# Patient Record
Sex: MALE | Race: WHITE | NOT HISPANIC OR LATINO | Employment: FULL TIME | URBAN - METROPOLITAN AREA
[De-identification: names, ages, dates, MRNs, and addresses within clinical notes are randomized per-mention and may not be internally consistent; named-entity substitution may affect disease eponyms.]

---

## 2019-12-18 ENCOUNTER — OFFICE VISIT CONVERTED (OUTPATIENT)
Dept: PULMONOLOGY | Facility: CLINIC | Age: 54
End: 2019-12-18
Attending: INTERNAL MEDICINE

## 2020-06-05 ENCOUNTER — OFFICE VISIT CONVERTED (OUTPATIENT)
Dept: PULMONOLOGY | Facility: CLINIC | Age: 55
End: 2020-06-05
Attending: NURSE PRACTITIONER

## 2021-05-28 VITALS
TEMPERATURE: 97.5 F | HEIGHT: 71 IN | SYSTOLIC BLOOD PRESSURE: 175 MMHG | WEIGHT: 220.5 LBS | RESPIRATION RATE: 18 BRPM | OXYGEN SATURATION: 99 % | HEART RATE: 62 BPM | DIASTOLIC BLOOD PRESSURE: 102 MMHG | BODY MASS INDEX: 30.87 KG/M2

## 2021-05-28 NOTE — PROGRESS NOTES
Patient: NELLA PIEDRA     Acct: [id number]     Report: #[id number]  UNIT #: [id number]     : 1965    Encounter Date:2019  PRIMARY CARE: TAINA CANALES  ***Signed***  --------------------------------------------------------------------------------------------------------------------  Chief Complaint      Encounter Date      Dec 18, 2019            Primary Care Provider      TAINA CANALES            Referring Provider      TAINA CANALES            Patient Complaint      Patient is complaining of      New Pt, Sarcoidosis            VITALS      Height 5 ft 11 in / 180.34 cm      Weight 220 lbs 8 oz / 100.357335 kg      BSA 2.20 m2      BMI 30.8 kg/m2      Temperature 97.5 F / 36.39 C - Oral      Pulse 62      Respirations 18      Blood Pressure 175/102 Sitting, Left Arm      Pulse Oximetry 99%, room air            HPI      The patient is a 54 year old male with diagnosis of sarcoidosis in  on     mediastinoscopy who is here today to establish care. The patient does complain     of shortness of breath. His shortness of breath is intermittent, worse with     certain fragrances, worse with exertion, does have associated wheezing,     shortness of breath has been present since being diagnosed in .  The patient    has underwent bronchoscopy x2 and was found to have endobronchial involvement     with sarcoid as well.  The patient is currently taking Trelegy prescribed to him    by his PCP and he has noticed significant improvement in his symptoms while     being on this medication.  He has no chest pains, heart palpitations or lower     extremity edema.  No visual changes at this time.  He denies having any skin     changes or neurologic changes, he does complain of some diffuse arthropathies.            ROS      Constitutional:  Complains of: Fatigue; Denies: Fever, Weight gain, Weight loss,    Chills, Insomnia, Other      Respiratory/Breathing:  Denies: Shortness of air, Wheezing, Cough,  Hemoptysis,     Pleuritic pain, Other      Endocrine:  Denies: Polydipsia, Polyuria, Heat/cold intolerance, Diabetes, Other      Eyes:  Denies: Blurred vision, Vision Changes, Other      Ears, nose, mouth, throat:  Denies: Mouth lesions, Thrush, Throat pain,     Hoarseness, Allergies/Hay Fever, Post Nasal Drip, Headaches, Recent Head Injury,    Nose Bleeding, Neck Stiffness, Thyroid Mass, Hearing Loss, Ear Fullness, Dry     Mouth, Nasal or Sinus Pain, Dry Lips, Nasal discharge, Nasal congestion, Other      Cardiovascular:  Denies: Palpitations, Syncope, Claudication, Chest Pain, Wake     up Gasping for air, Leg Swelling, Irregular Heart Rate, Cyanosis, Dyspnea on     Exertion, Other      Gastrointestinal:  Denies: Nausea, Constipation, Diarrhea, Abdominal pain,     Vomiting, Difficulty Swallowing, Reflux/Heartburn, Dysphagia, Jaundice,     Bloating, Melena, Bloody stools, Other      Genitourinary:  Denies: Urinary frequency, Incontinence, Hematuria, Urgency,     Nocturia, Dysuria, Testicular problems, Other      Musculoskeletal:  Denies: Joint Pain, Joint Stiffness, Joint Swelling, Myalgias,    Other      Hematologic/lymphatic:  DENIES: Lymphadenopathy, Bruising, Bleeding tendencies,     Other      Neurological:  Denies: Headache, Numbness, Weakness, Seizures, Other      Psychiatric:  Denies: Anxiety, Appropriate Effect, Depression, Other      Sleep:  No: Excessive daytime sleep, Morning Headache?, Snoring, Insomnia?, Stop    breathing at sleep?, Other      Integumentary:  Denies: Rash, Dry skin, Skin Warm to Touch, Other      Immunologic/Allergic:  Denies: Latex allergy, Seasonal allergies, Asthma,     Urticaria, Eczema, Other      Immunization status:  No: Up to date            FAMILY/SOCIAL/MEDICAL HX      Surgical History:  Yes: Throat Surgery (bronscopy), Other Surgeries ([place]    dosis, stint placement heart, heart caths)      Stroke - Family Hx:  Father      Heart - Family Hx:  Father, Grandparent, Uncle       Is Father Still Living?:  Yes      Is Mother Still Living?:  Yes      Social History:  No Tobacco Use, No Alcohol Use, No Recreational Drug use      Smoking status:  Never smoker      Anticoagulation Therapy:  Yes      Antibiotic Prophylaxis:  No      Medical History:  Yes: Allergies, Arthritis, Heart Attack, High Blood Pressure,     High Cholesterol      Psychiatric History      None            PREVENTION      Hx Influenza Vaccination:  Yes      Date Influenza Vaccine Given:  Dec 1, 2019      Influenza Vaccine Declined:  No      2 or More Falls Past Year?:  Yes      Fall Past Year with Injury?:  Yes      Hx Pneumococcal Vaccination:  Yes      Chart initiated by      Blanka Farrar MA      [facility]            ALLERGIES/MEDICATIONS      Allergies:        Coded Allergies:             Cephalosporins (Verified  Allergy, Intermediate, 12/18/19)      Medications    Last Reconciled on 12/18/19 10:35 by NISHA JOSEPH MD      Folic Acid (Folic Acid*) 0.4 Mg Tablet      400 MCG PO QDAY, TAB         Reported         12/18/19       Cyanocobalamin (Vitamin B-12*) 1,000 Mcg Tablet      1000 MCG PO QDAY, #30 TAB 0 Refills         Reported         12/18/19       Metoprolol Succinate (Metoprolol Succinate) 25 Mg Tab.er.24h      25 MG PO QDAY, #30 TAB 0 Refills         Reported         12/18/19       Clopidogrel Bisulfate (Plavix) 75 Mg Tablet      75 MG PO QDAY, TAB         Reported         12/18/19       Fluticasone/Umeclidin/Vilanter (Trelegy Ellipta 100-62.5-25) 1 Each Blst.w.dev      1 PUFF INH QDAY, #1 INH         Reported         12/18/19      Current Medications      Current Medications Reviewed 12/18/19            EXAM      Vital Signs Reviewed.      General:  WDWN, Alert, NAD.      HEENT: PERRL, EOMI.  OP, nares clear, no sinus tenderness.      Neck: Supple, no JVD, no thyromegaly.      Lymph: No axillary, cervical, supraclavicular lymphadenopathy noted bilaterally.      Chest: Good aeration, clear to auscultation bilaterally,  tympanic to percussion     bilaterally, no work of breathing noted.      CV: RRR, no MGR, pulses 2+, equal.        Abd: Soft, NT, ND, +BS, no HSM.      EXT: No clubbing, no cyanosis, no edema, no joint tenderness.        Neuro:  A  Skin: No rashes or lesions.      Vtials      Vitals:             Height 5 ft 11 in / 180.34 cm           Weight 220 lbs 8 oz / 100.207464 kg           BSA 2.20 m2           BMI 30.8 kg/m2           Temperature 97.5 F / 36.39 C - Oral           Pulse 62           Respirations 18           Blood Pressure 175/102 Sitting, Left Arm           Pulse Oximetry 99%, room air            REVIEW      Results Reviewed      PCCS Results Reviewed?:  Yes Prev Lab Results, Yes Prev Radiology Results, Yes     Previous [facility] Records            Assessment      Sarcoidosis - D86.9            Notes      New Medications      * Fluticasone/Umeclidin/Vilanter (Trelegy Ellipta 100-62.5-25) 1 EACH       BLST.W.DEV: 1 PUFF INH QDAY #1      * Clopidogrel Bisulfate (Plavix) 75 MG TABLET: 75 MG PO QDAY      * Metoprolol Succinate 25 MG TAB.ER.24H: 25 MG PO QDAY #30      * Cyanocobalamin (Vitamin B-12*) 1,000 MCG TABLET: 1,000 MCG PO QDAY #30      * Folic Acid (Folic Acid*) 0.4 MG TABLET: 400 MCG PO QDAY      New Diagnostics      * OH 1,25 dihyroxyvitamin D3, 6 Months         Dx: Sarcoidosis - D86.9      * CBC, 6 Months         Dx: Sarcoidosis - D86.9      * CMP Comp Metabolic Panel, 6 Months         Dx: Sarcoidosis - D86.9      New Office Procedures      * Flu Vacc Fluarix Quadrivalent, As Soon As Possible         Flu Vacc [id number](6Mos Up)/Pf (Fluarix Quadrivalent [id number] Syringe) 60        MCG/0.5 ML SYRINGE: 60 MICROGRAM INTRAMUSCULARLY Qty 1 SYRINGE      ASSESSMENT:       1.  Sarcoidosis.      2.  Endobronchial sarcoid.      3. Dyspnea.      4. History of renal stones.              PLAN:      1. We will continue patient on an inhaled steroid at this time. It seems like     his sarcoid is pretty well controlled.       2. We will obtain a vitamin D 1, 25 OH level.      3. We will obtain renal panel and CBC.      4. We will get pulmonary function studies and most recent high resolution CT     scan from [facility], this was performed in October of this year.      5. No indication for systemic treatment at this time.      6. I have personally reviewed laboratory data, imaging as well as previous     medical records.            Patient Education      Education resources provided:  Yes      Patient Education Provided:  Sarcoidosis            Patient Education:        Sarcoidosis            Electronically signed by Jose Soni  01/13/2020 11:50       Disclaimer: Converted document may not contain table formatting or lab diagrams. Please see [employer] for the authenticated document.

## 2021-05-28 NOTE — PROGRESS NOTES
Patient: NELLA ALONSO     Acct: [id number]     Report: #[id number]  UNIT #: [id number]     : 1965    Encounter Date:2020  PRIMARY CARE: TAINA CANALES  ***Signed***  --------------------------------------------------------------------------------------------------------------------  TELEHEALTH NOTE      History of Present Illness      Chief Complaint: 6 month follow up            Nella Alonso is presenting for evaluation via Telehealth visit by phone. Verbal     consent obtained before beginning visit.            Provider spent 23 minutes with the patient during telehealth visit.            The following staff were present during the visit: Chinyere Gonzalez MA, Corrina CAMERON            The patient is a 54 year old male patient of Dr. Soni's with a diagnosis of     sarcoidosis in  on mediascopy  who presents for Telehealth visit today. The     patient states his shortness of air is significantly improved since starting     trelegy ellipta inhaler. The patient states he does have some body aches. 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 The patient denies    any cough, wheezing fever or chills, night sweats, hemoptysis,  purulent sputum     production, swollen glands in head and neck, unintentional weight loss, chest     pain or chest tightness, abdominal pain, nausea or vomiting or diarrhea. The     patient states he does get intermittent swelling in his ankle after he has been     on his feet for 12 hours a day. The patient denies any calf pain. The patient     states he is under the care of Dr. Oglesby cardiologist in [place] for     coronary artery disease due to having a stent placed several years ago however     he would like to see Dr. Rincon in [place] and would like a  referral to    see him. The patient states he is also under the care of Dr. Rinne     ophthalmologist and get his yearly eye exams through him. The patient states he     has not had to take any antibiotics or steroids since his last office visit and     denies any hospitalizations since his office visit. The patient states he is     able to perform all his activities of daily living without difficulty. The     patient states he had his labs done at [facility] on 06/03/2020     and was told that his creatinine was elevated and his GFR was low. The patient     would like to see a kidney doctor. The patient states in 2015 he was seen at the    [facility] for sarcoidosis and had taken steroids in the past however he     does not like to take steroids because he is not able to focus and they make him    feel shaky inside. The patient states he was also tried on Plaquenil for sarc    oidosis however he did not tolerate the medication well. The patient states he     does not want any kind of medication therapy at this time for sarcoidosis.             I reviewed the Review of Systems, medical, surgical and family history and agree    with those as entered.               Physical exam is deferred due to Telehealth visit.            I personally reviewed Dr. Soni's last office note.                          Past Med History      Hx Sarcoidois dyspnea      never smoked       vaccines current      Overview of Symptoms      admits to body aches       denies soa cough wheezing fever chills            Allergies/Medications      Allergies:        Coded Allergies:             CEPHALOSPORINS (Verified  Allergy, Intermediate, 12/18/19)      Medications    Last Reconciled on 6/5/20 14:44 by JAQUELIN CORONADO       Fluticasone/Umeclidin/Vilanter (Trelegy Ellipta 100-62.5-25) 1 Each Blst.w.dev      1 PUFF INH QDAY, #1 INH         Prov: JAQUELIN CORONADO [facility]         6/5/20       Cyanocobalamin (Vitamin B-12*)  1,000 Mcg Tablet      1000 MCG PO QDAY, #30 TAB 0 Refills         Reported         12/18/19       Metoprolol Succinate (Metoprolol Succinate) 25 Mg Tab.er.24h      25 MG PO QDAY, #30 TAB 0 Refills         Reported         12/18/19       Clopidogrel Bisulfate (Plavix) 75 Mg Tablet      75 MG PO QDAY, TAB         Reported         12/18/19            Plan/Instructions      Ambulatory Assessment/Plan:        Elevated serum creatinine - R79.89            Sarcoidosis - D86.9            CAD (coronary artery disease) - I25.10            Notes      Renewed Medications      * Fluticasone/Umeclidin/Vilanter (Trelegy Ellipta 100-62.5-25) 1 EACH       BLST.W.DEV: 1 PUFF INH QDAY #1      New Diagnostics      * PFT Comp PrePost DLCO BodBx sx, Week         Dx: Sarcoidosis - D86.9      * 6 Min Walk w O2 Titration Test, Routine         Dx: Sarcoidosis - D86.9      * [facility] Pre-Op Covid Screening, Routine         Dx: Sarcoidosis - D86.9      * Chest W/ Cont High Resolution, 3 Months         Dx: Sarcoidosis - D86.9      New Referrals      * Nephrology, Routine         Dx: Elevated serum creatinine - R79.89      * Cardiology, Routine         Dx: Sarcoidosis - D86.9      Plan/Instructions      * Plan Of Care: ()            * Chronic conditions reviewed and taken into consideration for today's treatment       plan.      * Patient instructed to seek medical attention urgently for new or worsening       symptoms.      * Patient was educated/instructed on their diagnosis, treatment and medications       prior to discharge from the clinic today.            ASSESSMENT:       1. Sarcoidosis.       2.  Endobronchial sarcoid.       3. Dyspnea improved with trelegy ellipta inhaler.       4. History of renal stones.       5. Elevated creatinine.       6. Never smoker.       7. Arthralgias.       8. Coronary artery disease with history of stent.             PLAN:      1. Continue trelegy ellipta inhaler everyday as prescribed and rinse his mouth      after each use.       2. CBC and renal panel and vitamin D1, 25 level was ordered last visit and the     patient states he just had labs done at [facility] on 06/03/2020.     I will request a copy of those labs to be faxed to our office.       3. The patient states he was told his creatinine was elevated and his GFR was     low and this had been going on for some time. Upon reviewing labs sent from [facility] in July 2019, the patient had an elevated creatinine level     of 1.34. I will refer the patient to nephrology.       4. The patient states he is under the care of Dr. Oglesby in [place] for     coronary artery disease and history of stent and would like to establish care     with Dr. Rincon in [place]. I will refer the patient to Dr. Rincon.       5. The patient is having arthralgias and recommended referral to rheumatology     however the patient refuses rheumatology referral at this time. The patient     states that if his arthralgias get worse he will notify us and see rheumatology     however he does not want to take any medications at this time for sarcoidosis.       6. The patient is due for pulmonary function test and 6 minute walk test, I will     scheduled those. The patient prefers to have those done at [facility]. I will also order Pre-COVID screening.       7. The patient will be due for repeat high resolution CT scan of the chest in     October 2020, order placed today.       8. The patient is advised to call the office, call 911 or go to the ER for any     new or worsening symptoms.       9. The patient reports he is up to date with flu and pneumonia vaccines.       10. The patient is advised to avoid NSAIDS with elevated creatinine level and to     take  Tylenol for arthritic pain.       11. The patient is advised to follow up with Dr. Rinne ophthalmologist as     scheduled.       12. Follow up with Dr. Soni in 2 months,  sooner if needed.      Codes:  Phone Eval 21-30 mi [id number]            Electronically signed by JAQUELIN CORONADO [facility]  06/18/2020 11:57       Disclaimer: Converted document may not contain table formatting or lab diagrams. Please see [employer] for the authenticated document.

## 2022-09-21 ENCOUNTER — OFFICE VISIT (OUTPATIENT)
Dept: PULMONOLOGY | Facility: CLINIC | Age: 57
End: 2022-09-21

## 2022-09-21 VITALS
DIASTOLIC BLOOD PRESSURE: 85 MMHG | WEIGHT: 213.7 LBS | HEART RATE: 59 BPM | RESPIRATION RATE: 16 BRPM | TEMPERATURE: 98.2 F | OXYGEN SATURATION: 98 % | HEIGHT: 71 IN | SYSTOLIC BLOOD PRESSURE: 131 MMHG | BODY MASS INDEX: 29.92 KG/M2

## 2022-09-21 DIAGNOSIS — D86.9 SARCOIDOSIS: Primary | ICD-10-CM

## 2022-09-21 DIAGNOSIS — R06.09 DYSPNEA ON EXERTION: ICD-10-CM

## 2022-09-21 DIAGNOSIS — R05.9 COUGH: ICD-10-CM

## 2022-09-21 DIAGNOSIS — G47.33 OSA (OBSTRUCTIVE SLEEP APNEA): ICD-10-CM

## 2022-09-21 DIAGNOSIS — I10 ESSENTIAL HYPERTENSION: ICD-10-CM

## 2022-09-21 DIAGNOSIS — R06.2 WHEEZING: ICD-10-CM

## 2022-09-21 DIAGNOSIS — R09.89 CHEST CONGESTION: ICD-10-CM

## 2022-09-21 PROCEDURE — 99214 OFFICE O/P EST MOD 30 MIN: CPT | Performed by: NURSE PRACTITIONER

## 2022-09-21 RX ORDER — AMLODIPINE BESYLATE 5 MG/1
TABLET ORAL
COMMUNITY
Start: 2021-10-02

## 2022-09-21 RX ORDER — SODIUM, POTASSIUM,MAG SULFATES 17.5-3.13G
SOLUTION, RECONSTITUTED, ORAL ORAL
COMMUNITY
Start: 2022-09-13

## 2022-09-21 RX ORDER — DOXYCYCLINE 100 MG/1
TABLET ORAL
COMMUNITY
Start: 2022-09-16

## 2022-09-21 RX ORDER — LISINOPRIL 10 MG/1
TABLET ORAL
COMMUNITY

## 2022-09-21 RX ORDER — IPRATROPIUM BROMIDE AND ALBUTEROL SULFATE 2.5; .5 MG/3ML; MG/3ML
3 SOLUTION RESPIRATORY (INHALATION) 4 TIMES DAILY PRN
Qty: 360 ML | Refills: 3 | Status: SHIPPED | OUTPATIENT
Start: 2022-09-21

## 2022-09-21 RX ORDER — LANOLIN ALCOHOL/MO/W.PET/CERES
400 CREAM (GRAM) TOPICAL
COMMUNITY

## 2022-09-21 RX ORDER — LOSARTAN POTASSIUM 50 MG/1
50 TABLET ORAL 2 TIMES DAILY
COMMUNITY
Start: 2022-08-25

## 2022-09-21 RX ORDER — MODAFINIL 200 MG/1
200 TABLET ORAL EVERY MORNING
COMMUNITY
Start: 2022-07-13

## 2022-09-21 RX ORDER — FOLIC ACID 1 MG/1
TABLET ORAL DAILY
COMMUNITY
Start: 2022-01-07

## 2022-09-21 RX ORDER — PREDNISONE 1 MG/1
TABLET ORAL
COMMUNITY
Start: 2022-02-03 | End: 2022-09-21 | Stop reason: SDUPTHER

## 2022-09-21 RX ORDER — METOPROLOL SUCCINATE 50 MG/1
25 TABLET, EXTENDED RELEASE ORAL 2 TIMES DAILY
COMMUNITY
Start: 2022-08-25

## 2022-09-21 RX ORDER — DAPAGLIFLOZIN 5 MG/1
TABLET, FILM COATED ORAL
COMMUNITY
Start: 2022-09-16

## 2022-09-21 RX ORDER — FOLIC ACID 1 MG/1
1000 TABLET ORAL DAILY
COMMUNITY
Start: 2022-08-06

## 2022-09-21 RX ORDER — CLOPIDOGREL BISULFATE 75 MG/1
75 TABLET ORAL DAILY
COMMUNITY
Start: 2022-08-06

## 2022-09-21 RX ORDER — LOSARTAN POTASSIUM 50 MG/1
TABLET ORAL
COMMUNITY
Start: 2021-10-02

## 2022-09-21 RX ORDER — ROSUVASTATIN CALCIUM 40 MG/1
TABLET, COATED ORAL
COMMUNITY
Start: 2021-10-18

## 2022-09-21 RX ORDER — TAMSULOSIN HYDROCHLORIDE 0.4 MG/1
CAPSULE ORAL
COMMUNITY
Start: 2022-08-25

## 2022-09-21 RX ORDER — CLOPIDOGREL BISULFATE 75 MG/1
TABLET ORAL DAILY
COMMUNITY
Start: 2022-01-27

## 2022-09-21 RX ORDER — ROSUVASTATIN CALCIUM 40 MG/1
TABLET, COATED ORAL
COMMUNITY
Start: 2022-08-25

## 2022-09-21 RX ORDER — AMLODIPINE BESYLATE 5 MG/1
5 TABLET ORAL 2 TIMES DAILY
COMMUNITY
Start: 2022-08-25

## 2022-09-21 RX ORDER — METOPROLOL SUCCINATE 25 MG/1
TABLET, EXTENDED RELEASE ORAL
COMMUNITY
Start: 2021-10-02

## 2022-09-21 RX ORDER — ASPIRIN 81 MG/81MG
CAPSULE ORAL
COMMUNITY

## 2022-09-21 RX ORDER — FLUTICASONE PROPIONATE 50 MCG
1 SPRAY, SUSPENSION (ML) NASAL
COMMUNITY

## 2022-09-21 RX ORDER — PREDNISONE 2.5 MG
TABLET ORAL
COMMUNITY

## 2022-09-21 RX ORDER — METOPROLOL TARTRATE AND HYDROCHLOROTHIAZIDE 50; 25 MG/1; MG/1
TABLET ORAL
COMMUNITY

## 2022-09-21 RX ORDER — PREDNISONE 1 MG/1
5 TABLET ORAL DAILY
COMMUNITY
Start: 2022-08-31

## 2022-09-21 NOTE — PROGRESS NOTES
"Primary Care Provider  Concepcion Rust MD     Referring Provider  No ref. provider found     Chief Complaint  Cough, Wheezing, and Follow-up (Left chronic pneumonia, bronch evaluation. )    Subjective          Oliver Alonso presents to [facility]ULMON[facility] CRITICAL [facility]ARE MEDICINE  History of Present Illness  Oliver Alonso is a 56 y.o. male patient of Dr. Soni with management of sarcoidosis and shortness of breath.    Patient has not been seen in office since June 2020.  He states that he has had chest congestion since February 2022.  He states that he has had a few doses of antibiotics since then.  He also states that he recently saw his primary care provider last week who suggested that he may need a possible bronchoscopy.  He did have recent blood work with his primary care and states everything looked \"normal\".  His recent chest CT from 9/15/2022, shows stable disease, mild scattered reticulonodular interstitial changes in the lung apices, right perihilar lung, peripheral left upper lobe, stable since 2020, and no thoracic adenopathy.  These findings were discussed with patient today.  Dr. Soni is also able to patient scan and does not feel that he requires a bronchoscopy at this time.  She also sent him in an antibiotic at that time, but patient was unaware of this.  He states that he did just start taking his antibiotic last night.  He is on chronic prednisone at 5 mg daily.  He states that he has not had any increased doses of prednisone, as it makes him feel \"jittery inside\" and he does have issues with his blood sugar.  He states that his shortness of breath is mild in severity, worse with exertion and improved with rest.  He does use Trelegy as prescribed.  He states that he also has an albuterol inhaler, but does not use it often.  He states that he used to have stable other treatments in the past when he was first diagnosed with sarcoidosis, but has not had any recently.  He is " open to using nebulizers to help with his chest congestion.  Patient also states that he has been using Robitussin daily.  Suggest patient use Mucinex to help with his secretions, and he states that his primary care also mentioned this recently.  He continues wear his BiPAP machine nightly and is followed by sleep specialist in [place], [place].  He states that he wears it nightly and is unable to sleep without it.  He denies any excessive daytime sleepiness or morning headaches.  He does state that the specialist is ordering an overnight oximetry to see if he may need oxygen at night.  Patient also complains that he has had 2 episodes of extremely high blood pressure with syncopal episodes.  He is seeing a cardiologist and is on multiple blood pressure medications.  He is scheduled to have an echocardiogram tomorrow.  Overall, he has no additional concerns at this time.  He is able to perform his ADLs without difficulty.  He states that he does pace himself and tries his best not to get too out of breath.  He is up-to-date with his COVID and pneumonia vaccines.  He has not yet received a flu vaccine this season.     His history of smoking is   Tobacco Use: Low Risk    • Smoking Tobacco Use: Never Smoker   • Smokeless Tobacco Use: Never Used   .    Review of Systems   Constitutional: Negative for chills, fatigue, fever, unexpected weight gain and unexpected weight loss.   HENT: Negative for congestion (Nasal), hearing loss, mouth sores, nosebleeds, postnasal drip, sore throat and trouble swallowing.    Eyes: Negative for blurred vision and visual disturbance.   Respiratory: Positive for cough and shortness of breath. Negative for apnea and wheezing.         Negative for Hemoptysis     Cardiovascular: Negative for chest pain, palpitations and leg swelling.   Gastrointestinal: Negative for abdominal pain, constipation, diarrhea, nausea, vomiting and GERD.        Negative for Jaundice  Negative for  Bloating  Negative for Melena   Musculoskeletal: Negative for joint swelling and myalgias.        Negative for Joint pain  Negative for Joint stiffness   Skin: Negative for color change.        Negative for cyanosis   Neurological: Negative for syncope, weakness, numbness and headache.      Sleep: Negative for Excessive daytime sleepiness  Negative for morning headaches  Negative for Snoring    History reviewed. No pertinent family history.     Social History     Socioeconomic History   • Marital status:    Tobacco Use   • Smoking status: Never Smoker   • Smokeless tobacco: Never Used        History reviewed. No pertinent past medical history.     Immunization History   Administered Date(s) Administered   • BCG 09/16/2015   • COVID-19 (MODERNA) 1st, 2nd, 3rd Dose Only 03/17/2021, 09/17/2021   • Flu Vaccine Split Quad 10/18/2017   • Influenza, Unspecified 10/05/2021   • flucelvax quad pfs =>4 YRS 01/14/2019, 09/30/2021         Allergies   Allergen Reactions   • Cephalexin Unknown - Low Severity   • Cephalosporins Unknown - Low Severity   • Ezetimibe Unknown - Low Severity   • Levofloxacin Unknown - Low Severity   • Morphine And Related Unknown - Low Severity   • Nifedipine Unknown - Low Severity   • Penicillins Unknown - Low Severity   • Trandolapril Unknown - Low Severity          Current Outpatient Medications:   •  amLODIPine (NORVASC) 5 MG tablet, Take  by mouth., Disp: , Rfl:   •  Aspirin ([place]) 81 MG capsule, , Disp: , Rfl:   •  clopidogrel (PLAVIX) 75 MG tablet, Take  by mouth Daily., Disp: , Rfl:   •  cyanocobalamin (VITAMIN B-12) 1000 MCG tablet, Take 1,000 mcg by mouth., Disp: , Rfl:   •  doxycycline (ADOXA) 100 MG tablet, , Disp: , Rfl:   •  Farxiga 5 MG tablet tablet, , Disp: , Rfl:   •  fluticasone (FLONASE) 50 MCG/ACT nasal spray, 1 spray into the nostril(s) as directed by provider., Disp: , Rfl:   •  Fluticasone-Umeclidin-Vilant (Trelegy Ellipta) 200-62.5-25 MCG/INH inhaler, Inhale 1 puff  Daily., Disp: , Rfl:   •  folic acid (FOLVITE) 1 MG tablet, Take  by mouth Daily., Disp: , Rfl:   •  folic acid (FOLVITE) 400 MCG tablet, Take 400 mcg by mouth., Disp: , Rfl:   •  losartan (COZAAR) 50 MG tablet, Take 50 mg by mouth 2 (Two) Times a Day., Disp: , Rfl:   •  losartan (COZAAR) 50 MG tablet, Take  by mouth., Disp: , Rfl:   •  metFORMIN (GLUCOPHAGE) 500 MG tablet, Take 500 mg by mouth., Disp: , Rfl:   •  metoprolol-hydrochlorothiazide (LOPRESSOR HCT) 50-25 MG per tablet, , Disp: , Rfl:   •  predniSONE (DELTASONE) 5 MG tablet, Take 5 mg by mouth Daily., Disp: , Rfl:   •  rosuvastatin (CRESTOR) 40 MG tablet, Take  by mouth., Disp: , Rfl:   •  tamsulosin (FLOMAX) 0.4 MG capsule 24 hr capsule, TAKE ONE CAPSULE BY MOUTH EACH NIGHT AT BEDTIME, Disp: , Rfl:   •  amLODIPine (NORVASC) 5 MG tablet, Take 5 mg by mouth 2 (Two) Times a Day., Disp: , Rfl:   •  clopidogrel (PLAVIX) 75 MG tablet, Take 75 mg by mouth Daily., Disp: , Rfl:   •  folic acid (FOLVITE) 1 MG tablet, Take 1,000 mcg by mouth Daily., Disp: , Rfl:   •  ipratropium-albuterol (DUO-NEB) 0.5-2.5 mg/3 ml nebulizer, Take 3 mL by nebulization 4 (Four) Times a Day As Needed for Wheezing., Disp: 360 mL, Rfl: 3  •  lisinopril (PRINIVIL,ZESTRIL) 10 MG tablet, Take  by mouth., Disp: , Rfl:   •  metoprolol succinate XL (TOPROL-XL) 25 MG 24 hr tablet, Take  by mouth., Disp: , Rfl:   •  metoprolol succinate XL (TOPROL-XL) 50 MG 24 hr tablet, Take 25 mg by mouth 2 (Two) Times a Day., Disp: , Rfl:   •  metoprolol tartrate (LOPRESSOR) 25 MG tablet, Take  by mouth Daily., Disp: , Rfl:   •  modafinil (PROVIGIL) 200 MG tablet, Take 200 mg by mouth Every Morning., Disp: , Rfl:   •  predniSONE (DELTASONE) 2.5 MG tablet, , Disp: , Rfl:   •  rosuvastatin (CRESTOR) 40 MG tablet, TAKE ONE TABLET BY MOUTH EACH NIGHT AT BEDTIME, Disp: , Rfl:   •  Suprep Bowel Prep Kit 17.5-3.13-1.6 GM/177ML solution oral solution, , Disp: , Rfl:      Objective   Physical Exam  Constitutional:   "     General: He is not in acute distress.     Appearance: Normal appearance. He is normal weight.   HENT:      Right Ear: Hearing normal.      Left Ear: Hearing normal.      Nose: No nasal tenderness or congestion.      Mouth/Throat:      Mouth: Mucous membranes are moist. No oral lesions.   Eyes:      Extraocular Movements: Extraocular movements intact.      Pupils: Pupils are equal, round, and reactive to light.   Neck:      Thyroid: No thyroid mass or thyromegaly.   Cardiovascular:      Rate and Rhythm: Normal rate and regular rhythm.      Pulses: Normal pulses.      Heart sounds: Normal heart sounds. No murmur heard.  Pulmonary:      Effort: Pulmonary effort is normal.      Breath sounds: Examination of the left-upper field reveals rhonchi. Rhonchi present. No wheezing or rales.   Chest:   Breasts:      Right: No axillary adenopathy.       Abdominal:      General: Bowel sounds are normal. There is no distension.      Palpations: Abdomen is soft.      Tenderness: There is no abdominal tenderness.   Musculoskeletal:      Cervical back: Neck supple.      Right lower leg: No edema.      Left lower leg: No edema.   Lymphadenopathy:      Cervical: No cervical adenopathy.      Upper Body:      Right upper body: No axillary adenopathy.   Skin:     General: Skin is warm and dry.      Findings: No lesion or rash.   Neurological:      General: No focal deficit present.      Mental Status: He is alert and oriented to person, place, and time.      Cranial Nerves: Cranial nerves are intact.   Psychiatric:         Mood and Affect: Affect normal. Mood is not anxious or depressed.         Vital Signs:   /85 (BP Location: Left arm, Patient Position: Sitting, Cuff Size: Adult)   Pulse 59   Temp 98.2 °F (36.8 °C) (Temporal)   Resp 16   Ht 180.3 cm (71\")   Wt 96.9 kg (213 lb 11.2 oz)   SpO2 98% Comment: room air  BMI 29.81 kg/m²        Result Review :       Data reviewed: Radiologic studies Scanned chest CT 9/15/2022 " and Corrina CAMERON and Dr. Soni's last office notes   Procedures        Assessment and Plan    Diagnoses and all orders for this visit:    1. Sarcoidosis (Primary)  -     ipratropium-albuterol (DUO-NEB) 0.5-2.5 mg/3 ml nebulizer; Take 3 mL by nebulization 4 (Four) Times a Day As Needed for Wheezing.  Dispense: 360 mL; Refill: 3  -     Home Nebulizer    2. Cough  -     ipratropium-albuterol (DUO-NEB) 0.5-2.5 mg/3 ml nebulizer; Take 3 mL by nebulization 4 (Four) Times a Day As Needed for Wheezing.  Dispense: 360 mL; Refill: 3  -     Home Nebulizer    3. Wheezing  -     ipratropium-albuterol (DUO-NEB) 0.5-2.5 mg/3 ml nebulizer; Take 3 mL by nebulization 4 (Four) Times a Day As Needed for Wheezing.  Dispense: 360 mL; Refill: 3  -     Home Nebulizer    4. MARTIN (obstructive sleep apnea)    5. Dyspnea on exertion    6. Chest congestion    7. Essential hypertension    8.  Continue Trelegy as prescribed.  Rinse mouth out after each use.  9.  Continue albuterol as needed.  10.  Start DuoNebs as needed.  Home nebulizer given in office today.  11.  Follow-up with sleep medicine as scheduled.  12.  Follow-up with cardiology as scheduled.  13.  Follow-up with primary care as scheduled.  14.  Follow-up with Dr. Redd in 1 to 2 months, sooner if needed        Follow Up {Instructions Charge Capture  Follow-up Communications :23}  Return in about 1 month (around 10/21/2022) for Recheck 1-2 months with Ivania.  Patient was given instructions and counseling regarding his condition or for health maintenance advice. Please see specific information pulled into the AVS if appropriate.

## 2022-09-21 NOTE — PATIENT INSTRUCTIONS
Sleep Apnea  Sleep apnea is a condition in which breathing pauses or becomes shallow during sleep. People with sleep apnea usually snore loudly. They may have times when they gasp and stop breathing for 10 seconds or more during sleep. This may happen many times during the night.  Sleep apnea disrupts your sleep and keeps your body from getting the rest that it needs. This condition can increase your risk of certain health problems, including:  Heart attack.  Stroke.  Obesity.  Type 2 diabetes.  Heart failure.  Irregular heartbeat.  High blood pressure.  The goal of treatment is to help you breathe normally again.  What are the causes?  The most common cause of sleep apnea is a collapsed or blocked airway.  There are three kinds of sleep apnea:  Obstructive sleep apnea. This kind is caused by a blocked or collapsed airway.  Central sleep apnea. This kind happens when the part of the brain that controls breathing does not send the correct signals to the muscles that control breathing.  Mixed sleep apnea. This is a combination of obstructive and central sleep apnea.  What increases the risk?  You are more likely to develop this condition if you:  Are overweight.  Smoke.  Have a smaller than normal airway.  Are older.  Are male.  Drink alcohol.  Take sedatives or tranquilizers.  Have a family history of sleep apnea.  Have a tongue or tonsils that are larger than normal.  What are the signs or symptoms?  Symptoms of this condition include:  Trouble staying asleep.  Loud snoring.  Morning headaches.  Waking up gasping.  Dry mouth or sore throat in the morning.  Daytime sleepiness and tiredness.  If you have daytime fatigue because of sleep apnea, you may be more likely to have:  Trouble concentrating.  Forgetfulness.  Irritability or mood swings.  Personality changes.  Feelings of depression.  Sexual dysfunction. This may include loss of interest if you are female, or erectile dysfunction if you are male.  How is this  diagnosed?  This condition may be diagnosed with:  A medical history.  A physical exam.  A series of tests that are done while you are sleeping (sleep study). These tests are usually done in a sleep lab, but they may also be done at home.  How is this treated?  Treatment for this condition aims to restore normal breathing and to ease symptoms during sleep. It may involve managing health issues that can affect breathing, such as high blood pressure or obesity. Treatment may include:  Sleeping on your side.  Using a decongestant if you have nasal congestion.  Avoiding the use of depressants, including alcohol, sedatives, and narcotics.  Losing weight if you are overweight.  Making changes to your diet.  Quitting smoking.  Using a device to open your airway while you sleep, such as:  An oral appliance. This is a custom-made mouthpiece that shifts your lower jaw forward.  A continuous positive airway pressure (CPAP) device. This device blows air through a mask when you breathe out (exhale).  A nasal expiratory positive airway pressure (EPAP) device. This device has valves that you put into each nostril.  A bi-level positive airway pressure (BPAP) device. This device blows air through a mask when you breathe in (inhale) and breathe out (exhale).  Having surgery if other treatments do not work. During surgery, excess tissue is removed to create a wider airway.  Follow these instructions at home:  Lifestyle  Make any lifestyle changes that your health care provider recommends.  Eat a healthy, well-balanced diet.  Take steps to lose weight if you are overweight.  Avoid using depressants, including alcohol, sedatives, and narcotics.  Do not use any products that contain nicotine or tobacco. These products include cigarettes, chewing tobacco, and vaping devices, such as e-cigarettes. If you need help quitting, ask your health care provider.  General instructions  Take over-the-counter and prescription medicines only as told  by your health care provider.  If you were given a device to open your airway while you sleep, use it only as told by your health care provider.  If you are having surgery, make sure to tell your health care provider you have sleep apnea. You may need to bring your device with you.  Keep all follow-up visits. This is important.  Contact a health care provider if:  The device that you received to open your airway during sleep is uncomfortable or does not seem to be working.  Your symptoms do not improve.  Your symptoms get worse.  Get help right away if:  You develop:  Chest pain.  Shortness of breath.  Discomfort in your back, arms, or stomach.  You have:  Trouble speaking.  Weakness on one side of your body.  Drooping in your face.  These symptoms may represent a serious problem that is an emergency. Do not wait to see if the symptoms will go away. Get medical help right away. Call your local emergency services (911 in the [place] Do not drive yourself to the hospital.  Summary  Sleep apnea is a condition in which breathing pauses or becomes shallow during sleep.  The most common cause is a collapsed or blocked airway.  The goal of treatment is to restore normal breathing and to ease symptoms during sleep.  This information is not intended to replace advice given to you by your health care provider. Make sure you discuss any questions you have with your health care provider.  Document Revised: 11/26/2021 Document Reviewed: 11/26/2021  [employer] Patient Education © 2022 [employer]

## 2022-11-08 ENCOUNTER — OFFICE VISIT (OUTPATIENT)
Dept: PULMONOLOGY | Facility: CLINIC | Age: 57
End: 2022-11-08

## 2022-11-08 VITALS
WEIGHT: 213 LBS | BODY MASS INDEX: 29.82 KG/M2 | TEMPERATURE: 98.2 F | RESPIRATION RATE: 18 BRPM | SYSTOLIC BLOOD PRESSURE: 129 MMHG | HEIGHT: 71 IN | HEART RATE: 61 BPM | DIASTOLIC BLOOD PRESSURE: 83 MMHG | OXYGEN SATURATION: 98 %

## 2022-11-08 DIAGNOSIS — D86.0 PULMONARY SARCOIDOSIS: Primary | ICD-10-CM

## 2022-11-08 PROCEDURE — 99214 OFFICE O/P EST MOD 30 MIN: CPT | Performed by: STUDENT IN AN ORGANIZED HEALTH CARE EDUCATION/TRAINING PROGRAM

## 2022-11-08 NOTE — PROGRESS NOTES
Primary Care Provider  Concepcion Rust MD   Referring Provider  No ref. provider found      Patient Complaint  No chief complaint on file.      Subjective          Oliver Alonso presents to [facility] PULMONARY & CRITICAL CARE MEDICINE      History of Presenting Illness  Oliver Alonso is a 57 y.o. male with history of sarcoidosis diagnosed 2009 on mediastinoscopy, MARTIN, hypertension here for follow-up.    Patient reports feeling pretty good today.  His symptoms have been controlled on maintenance dose of prednisone 5 mg daily.  He also uses his nebulizers twice a day every day as well his Trelegy inhaler daily.  With this regimen he feels that he is able to do his day-to-day activities.  He still verbalizes a chronic dull pain in his chest, which has been there for many years since his diagnosis of sarcoidosis.  Recent CT done did not show acute abnormalities.  Patient verbalized that he guesses labs done in [place].  He has been a never smoker. I have personally reviewed the review of systems, past family, social, medical and surgical histories; and agree with their findings.      Review of Systems  Constitutional:  No fever. No chills. No weakness.  ENT:  No sore throat, URI symptoms.   Cardiovascular:  +chronic chest pain, dull; No palpitations. No lower extremity edema.  Respiratory:  No shortness of breath, cough, pleuritic chest pain. No hemoptysis. No dyspnea.   GI:  Normal appetite. No nausea, vomiting, diarrhea. No melena.  Musculoskeletal:  No arthralgias or myalgias.  Neurologic:  No weakness    No family history on file.     Social History     Socioeconomic History   • Marital status:    Tobacco Use   • Smoking status: Never   • Smokeless tobacco: Never        No past medical history on file.     Immunization History   Administered Date(s) Administered   • BCG 09/16/2015   • COVID-19 (MODERNA) 1st, 2nd, 3rd Dose Only 03/17/2021, 09/17/2021   • Flu Vaccine Split Quad  10/18/2017   • Influenza, Unspecified 10/05/2021   • flucelvax quad pfs =>4 YRS 01/14/2019, 09/30/2021       Allergies   Allergen Reactions   • Cephalexin Unknown - Low Severity   • Cephalosporins Unknown - Low Severity   • Ezetimibe Unknown - Low Severity   • Levofloxacin Unknown - Low Severity   • Morphine And Related Unknown - Low Severity   • Nifedipine Unknown - Low Severity   • Penicillins Unknown - Low Severity   • Trandolapril Unknown - Low Severity          Current Outpatient Medications:   •  amLODIPine (NORVASC) 5 MG tablet, Take  by mouth., Disp: , Rfl:   •  amLODIPine (NORVASC) 5 MG tablet, Take 5 mg by mouth 2 (Two) Times a Day., Disp: , Rfl:   •  Aspirin (Vazalore) 81 MG capsule, , Disp: , Rfl:   •  clopidogrel (PLAVIX) 75 MG tablet, Take  by mouth Daily., Disp: , Rfl:   •  clopidogrel (PLAVIX) 75 MG tablet, Take 75 mg by mouth Daily., Disp: , Rfl:   •  cyanocobalamin (VITAMIN B-12) 1000 MCG tablet, Take 1,000 mcg by mouth., Disp: , Rfl:   •  doxycycline (ADOXA) 100 MG tablet, , Disp: , Rfl:   •  Farxiga 5 MG tablet tablet, , Disp: , Rfl:   •  fluticasone (FLONASE) 50 MCG/ACT nasal spray, 1 spray into the nostril(s) as directed by provider., Disp: , Rfl:   •  Fluticasone-Umeclidin-Vilant (Trelegy Ellipta) 200-62.5-25 MCG/INH inhaler, Inhale 1 puff Daily., Disp: , Rfl:   •  folic acid (FOLVITE) 1 MG tablet, Take 1,000 mcg by mouth Daily., Disp: , Rfl:   •  folic acid (FOLVITE) 1 MG tablet, Take  by mouth Daily., Disp: , Rfl:   •  folic acid (FOLVITE) 400 MCG tablet, Take 400 mcg by mouth., Disp: , Rfl:   •  ipratropium-albuterol (DUO-NEB) 0.5-2.5 mg/3 ml nebulizer, Take 3 mL by nebulization 4 (Four) Times a Day As Needed for Wheezing., Disp: 360 mL, Rfl: 3  •  lisinopril (PRINIVIL,ZESTRIL) 10 MG tablet, Take  by mouth., Disp: , Rfl:   •  losartan (COZAAR) 50 MG tablet, Take 50 mg by mouth 2 (Two) Times a Day., Disp: , Rfl:   •  losartan (COZAAR) 50 MG tablet, Take  by mouth., Disp: , Rfl:   •   metFORMIN (GLUCOPHAGE) 500 MG tablet, Take 500 mg by mouth., Disp: , Rfl:   •  metoprolol succinate XL (TOPROL-XL) 25 MG 24 hr tablet, Take  by mouth., Disp: , Rfl:   •  metoprolol succinate XL (TOPROL-XL) 50 MG 24 hr tablet, Take 25 mg by mouth 2 (Two) Times a Day., Disp: , Rfl:   •  metoprolol tartrate (LOPRESSOR) 25 MG tablet, Take  by mouth Daily., Disp: , Rfl:   •  metoprolol-hydrochlorothiazide (LOPRESSOR HCT) 50-25 MG per tablet, , Disp: , Rfl:   •  modafinil (PROVIGIL) 200 MG tablet, Take 200 mg by mouth Every Morning., Disp: , Rfl:   •  predniSONE (DELTASONE) 2.5 MG tablet, , Disp: , Rfl:   •  predniSONE (DELTASONE) 5 MG tablet, Take 5 mg by mouth Daily., Disp: , Rfl:   •  rosuvastatin (CRESTOR) 40 MG tablet, Take  by mouth., Disp: , Rfl:   •  rosuvastatin (CRESTOR) 40 MG tablet, TAKE ONE TABLET BY MOUTH EACH NIGHT AT BEDTIME, Disp: , Rfl:   •  Suprep Bowel Prep Kit 17.5-3.13-1.6 GM/177ML solution oral solution, , Disp: , Rfl:   •  tamsulosin (FLOMAX) 0.4 MG capsule 24 hr capsule, TAKE ONE CAPSULE BY MOUTH EACH NIGHT AT BEDTIME, Disp: , Rfl:      Objective     Vital Signs:   There were no vitals taken for this visit.    Physical Exam  Vital Signs Reviewed   WDWN, Alert, NAD.    HEENT:  EOMI.  nares clear  Neck:  Supple, no JVD, no thyromegaly  Chest:  clear to auscultation bilaterally, no wheezes, crackles; no work of breathing noted  CV: RRR, no M/G/R, pulses 2+, equal.  Abd:  Soft, NT, ND, +BS  EXT:  no clubbing, no cyanosis, no edema  Neuro:  A&Ox3, CN grossly intact, no focal deficits.  Skin: No rashes or lesions noted       Result Review :   I have personally reviewed patient's labs and images.              There is no problem list on file for this patient.      Impression and Plan    1. Sarcoidosis, biopsy-proven in 2009 from mediastinoscopy    -Continue prednisone 5 mg daily  -Continue nebulizers twice a day as needed  -Continue Trelegy inhaler daily  -Since patient has his blood work are done in  [place] with his PCP, informed him to have his vitamin D levels checked, renal function, as well as his CBC checked.  He should also have a baseline EKG.  Patient reports that he follows his cardiologist for his stent.   -will order PFTs to assess lung function  -Follow-up in 6 months    Smoking status: Reviewed  Vaccination status: Patient is up-to-date with his COVID and influenza vaccinations  Medications personally reviewed    Follow Up   No follow-ups on file.  Patient was given instructions and counseling regarding his condition or for health maintenance advice. Please see specific information pulled into the AVS if appropriate.

## 2023-02-08 ENCOUNTER — HOSPITAL ENCOUNTER (OUTPATIENT)
Dept: RESPIRATORY THERAPY | Facility: HOSPITAL | Age: 58
Discharge: HOME | End: 2023-02-08
Admitting: STUDENT IN AN ORGANIZED HEALTH CARE EDUCATION/TRAINING PROGRAM
Payer: COMMERCIAL

## 2023-02-08 DIAGNOSIS — D86.0 PULMONARY SARCOIDOSIS: ICD-10-CM

## 2023-02-08 PROCEDURE — 94729 DIFFUSING CAPACITY: CPT

## 2023-02-08 PROCEDURE — 94640 AIRWAY INHALATION TREATMENT: CPT

## 2023-02-08 PROCEDURE — 94726 PLETHYSMOGRAPHY LUNG VOLUMES: CPT

## 2023-02-08 PROCEDURE — 94729 DIFFUSING CAPACITY: CPT | Performed by: STUDENT IN AN ORGANIZED HEALTH CARE EDUCATION/TRAINING PROGRAM

## 2023-02-08 PROCEDURE — 94726 PLETHYSMOGRAPHY LUNG VOLUMES: CPT | Performed by: STUDENT IN AN ORGANIZED HEALTH CARE EDUCATION/TRAINING PROGRAM

## 2023-02-08 PROCEDURE — 94060 EVALUATION OF WHEEZING: CPT

## 2023-02-08 PROCEDURE — 94060 EVALUATION OF WHEEZING: CPT | Performed by: STUDENT IN AN ORGANIZED HEALTH CARE EDUCATION/TRAINING PROGRAM

## 2023-02-08 RX ORDER — ALBUTEROL SULFATE 2.5 MG/3ML
2.5 SOLUTION RESPIRATORY (INHALATION) ONCE
Status: COMPLETED | OUTPATIENT
Start: 2023-02-08 | End: 2023-02-08

## 2023-02-08 RX ADMIN — ALBUTEROL SULFATE 2.5 MG: 2.5 SOLUTION RESPIRATORY (INHALATION) at 09:19

## 2023-10-23 ENCOUNTER — OFFICE VISIT (OUTPATIENT)
Dept: NEUROLOGY | Facility: CLINIC | Age: 58
End: 2023-10-23
Payer: COMMERCIAL

## 2023-10-23 VITALS
HEIGHT: 71 IN | BODY MASS INDEX: 29.68 KG/M2 | WEIGHT: 212 LBS | SYSTOLIC BLOOD PRESSURE: 127 MMHG | HEART RATE: 71 BPM | DIASTOLIC BLOOD PRESSURE: 83 MMHG

## 2023-10-23 DIAGNOSIS — G40.109 LOCALIZATION-RELATED SYMPTOMATIC EPILEPSY AND EPILEPTIC SYNDROMES WITH SIMPLE PARTIAL SEIZURES, NOT INTRACTABLE, WITHOUT STATUS EPILEPTICUS: ICD-10-CM

## 2023-10-23 DIAGNOSIS — I69.30 SEQUELAE, POST-STROKE: Primary | ICD-10-CM

## 2023-10-23 PROCEDURE — 99204 OFFICE O/P NEW MOD 45 MIN: CPT | Performed by: PSYCHIATRY & NEUROLOGY

## 2023-10-23 RX ORDER — LEVETIRACETAM 500 MG/1
500 TABLET ORAL 2 TIMES DAILY
Qty: 180 TABLET | Refills: 3 | Status: SHIPPED | OUTPATIENT
Start: 2023-10-23 | End: 2023-10-23

## 2023-10-23 RX ORDER — MEMANTINE HYDROCHLORIDE 5 MG/1
10 TABLET ORAL 2 TIMES DAILY
COMMUNITY

## 2023-10-23 RX ORDER — METHYLPHENIDATE HYDROCHLORIDE 10 MG/1
10 TABLET ORAL 2 TIMES DAILY
COMMUNITY

## 2023-10-23 RX ORDER — ASPIRIN 81 MG/1
81 TABLET ORAL DAILY
COMMUNITY

## 2023-10-23 RX ORDER — APIXABAN 5 MG/1
1 TABLET, FILM COATED ORAL EVERY 12 HOURS SCHEDULED
COMMUNITY
Start: 2023-10-19

## 2023-10-23 RX ORDER — LEVETIRACETAM 500 MG/1
TABLET ORAL
Qty: 120 TABLET | Refills: 3 | Status: SHIPPED | OUTPATIENT
Start: 2023-10-23

## 2023-10-23 RX ORDER — LEVETIRACETAM 500 MG/1
500 TABLET ORAL 2 TIMES DAILY
Qty: 180 TABLET | Refills: 3 | Status: CANCELLED | OUTPATIENT
Start: 2023-10-23

## 2023-10-23 NOTE — PROGRESS NOTES
Chief Complaint  Neurologic Problem (CVA VS SEIZURE)    Subjective          Oliver Alonso is a 58 y.o. male who presents to [facility] NEUROLOGY & NEUROSURGERY  History of Present Illness  58-year-old man evaluated for memory problems as well as visual hallucinations.  He states that he has had a handful of seeing things next to him and he does not recognize what they are but he thinks that something is  .  He is seen what looks like animals a tree on the road lasting for seconds at a time.  He has had episodes in which there is a loss of recollection for time and events in which she is doing stuff in his computer and he does not remember what he is doing and makes mistakes.  It only last for minutes at a time.  There is times in which his wife talks to him and he loses his train of thought for 15 seconds.  He remembers what he is doing but he does not remember what the conversation is and he has to think about it.  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  He states that about a year ago he drove to another [facility] from where he is and got lost and does not know how he got there.  He did not know where he was.    He had an MRI of the brain that was done recently showing a stroke in the right parieto-occipital region above the tentorium.  He states that 2 years ago he got up and about few steps later his left leg and arm went weak and him that lasted for several minutes and then by the time he got home and drove he was back to normal.  He is blood pressure is elevated and he went to see his doctor and was seen at [facility] in which they did a CT scan on him and then he was transferred to [place].  He states that he was worked up at that time by cardiology but did not have an MRI of the brain.    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   "He is able to do his activities of daily living and his wife states that there is nothing wrong with his memory.    He has a history of narcolepsy and is being followed.  He has a history of pulmonary sarcoidosis.    Objective   Vital Signs:   /83   Pulse 71   Ht 180.3 cm (71\")   Wt 96.2 kg (212 lb)   BMI 29.57 kg/m²     Physical Exam   He is alert, fluent, follows commands well and he is able to tell me the history as noted above.  He is fully oriented.  Optic disc are normal bilaterally fields are full, EOMs full directions gaze, facial strength is full, soft palate elevation and tongue are normal.  There is no weakness of the upper or lower extremities and vision muscle testing.  Fine finger movements are intact.  Reflexes are symmetrical and decreased in biceps, triceps, patellar's and ankles with cerebellar testing is intact.  Station gait is able to tiptoe, heel walk, eagle without difficulty.  Armswing is normal and turning is intact.  Heart is regular rhythm normal in rate.  Carotids are clear bilaterally.        Assessment and Plan  Diagnoses and all orders for this visit:    1. Sequelae, post-stroke (Primary)  Assessment & Plan:  Showed him and his wife the abnormality that is thought to be a stroke.  His cardiologist started him on Eliquis and took him off Plavix and aspirin since he had his event on aspirin and Plavix.  I discussed with him that I will do a CTA of the head and neck.  He is going to have a follow-up visit with his cardiologist in the next 2 weeks and I would recommend for him to have a loop monitor as well as schedule him to have a transesophageal echo even though the event was most likely 2 years ago.  I discussed with him and his wife that I do not know the etiology of the stroke and it would be a good idea to have him have a loop monitor as well as a ALFONSO as part of the work-up.  We will see him again in 6 to 8 weeks time for follow-up.  Thank you for letting me participate in " his care.    Orders:  -     CT Angiogram Neck With & Without Contrast; Future  -     CT Angiogram Head; Future  -     Ambulatory Referral to Neurology    2. Localization-related symptomatic epilepsy and epileptic syndromes with simple partial seizures, not intractable, without status epilepticus  Assessment & Plan:  I discussed with him and his wife that his spells are most likely secondary to focal seizures and I will start him on Keppra 500 mg twice a day for 2 weeks and then to 1000 mg twice a day thereafter.  I discussed with him the adverse effects of the medication including psychiatric adverse effects and they are to call me for any problems.  I will also refer him to [facility] epilepsy clinic just in case that the medications are not effective and then I have to start him on another antiepileptic medication.  He is aware of [place] driving laws.  He is not to drive for 3 months.  He is to take precautions to avoid heights, power equipment, moving machinery, water.  He is to keep an event calendar.    Orders:  -     Ambulatory Referral to Neurology    Other orders  -     Discontinue: levETIRAcetam (KEPPRA) 500 MG tablet; Take 1 tablet by mouth 2 (Two) Times a Day.  Dispense: 180 tablet; Refill: 3  -     levETIRAcetam (KEPPRA) 500 MG tablet; 1 twice a day for 2 weeks then 2 twice a day thereafter.  Dispense: 120 tablet; Refill: 3         Total time spent with the patient and coordinating patient care was 46 minutes.    Follow Up  No follow-ups on file.  Patient was given instructions and counseling regarding his condition or for health maintenance advice. Please see specific information pulled into the AVS if appropriate.

## 2023-10-23 NOTE — ASSESSMENT & PLAN NOTE
Showed him and his wife the abnormality that is thought to be a stroke.  His cardiologist started him on Eliquis and took him off Plavix and aspirin since he had his event on aspirin and Plavix.  I discussed with him that I will do a CTA of the head and neck.  He is going to have a follow-up visit with his cardiologist in the next 2 weeks and I would recommend for him to have a loop monitor as well as schedule him to have a transesophageal echo even though the event was most likely 2 years ago.  I discussed with him and his wife that I do not know the etiology of the stroke and it would be a good idea to have him have a loop monitor as well as a ALFONSO as part of the work-up.  We will see him again in 6 to 8 weeks time for follow-up.  Thank you for letting me participate in his care.

## 2023-10-23 NOTE — ASSESSMENT & PLAN NOTE
I discussed with him and his wife that his spells are most likely secondary to focal seizures and I will start him on Keppra 500 mg twice a day for 2 weeks and then to 1000 mg twice a day thereafter.  I discussed with him the adverse effects of the medication including psychiatric adverse effects and they are to call me for any problems.  I will also refer him to [facility] epilepsy clinic just in case that the medications are not effective and then I have to start him on another antiepileptic medication.  He is aware of [place] driving laws.  He is not to drive for 3 months.  He is to take precautions to avoid heights, power equipment, moving machinery, water.  He is to keep an event calendar.

## 2023-10-25 ENCOUNTER — TELEPHONE (OUTPATIENT)
Dept: NEUROLOGY | Facility: CLINIC | Age: 58
End: 2023-10-25
Payer: COMMERCIAL

## 2023-10-25 DIAGNOSIS — G40.109 LOCALIZATION-RELATED SYMPTOMATIC EPILEPSY AND EPILEPTIC SYNDROMES WITH SIMPLE PARTIAL SEIZURES, NOT INTRACTABLE, WITHOUT STATUS EPILEPTICUS: Primary | ICD-10-CM

## 2023-10-25 RX ORDER — LACOSAMIDE 100 MG/1
TABLET ORAL
Qty: 60 TABLET | Refills: 5 | Status: SHIPPED | OUTPATIENT
Start: 2023-10-25

## 2023-10-25 RX ORDER — LACOSAMIDE 50 MG/1
TABLET ORAL
Qty: 14 TABLET | Refills: 0 | Status: SHIPPED | OUTPATIENT
Start: 2023-10-25

## 2023-10-25 NOTE — TELEPHONE ENCOUNTER
Called pt wife and relayed Dr. Cleary's message: We will try Vimpat 50 mg twice a day x1 week and then 100 mg twice a day thereafter.  Stop taking Keppra but do not throw away the Keppra.   Instructed to call if pt has questions or concerns.   Pt wife verbalized understanding.

## 2023-10-25 NOTE — TELEPHONE ENCOUNTER
Caller: [facility]     Relationship: WIFE     Best call back number: [phone] YOU CAN LEAVE A DETAILED MESS ON V.M IF YOU GET     What was the call regarding: PT TOOK THE RX KEPPRA YOU PRESCRIBED. / ONLY TOOK ONE TAB. THE DOSE PRETTY MUCH KNOCKED HIM OUT THE WHOLE DAY INTO LAST NIGHT. HE HAS NOT TAKEN ANY MORE SINCE MONDAY 8:30PM WAITING TO SEE WHAT TO DO.         Is it okay if the provider responds through MyChart: CALL    PLEASE  CALL ASAP TO INSTRUCT THEM ON WHAT TO DO.     PLEASE ADVISE

## 2023-11-07 ENCOUNTER — TELEPHONE (OUTPATIENT)
Dept: NEUROLOGY | Facility: CLINIC | Age: 58
End: 2023-11-07

## 2023-11-07 NOTE — TELEPHONE ENCOUNTER
[facility] received a fax that requires your attention. The document has been indexed to the patient’s chart for your review.    Reason for sending: MEDICAL RECORDS NOTIFICATION    Documents Description: ECG_[id number]_11/01/23    Name of Sender: [facility]    Date Indexed: 11/07/23    Notes (if needed): MEDICAL RECORDS NOTIFICATION

## 2023-11-14 ENCOUNTER — TELEPHONE (OUTPATIENT)
Dept: NEUROLOGY | Facility: CLINIC | Age: 58
End: 2023-11-14
Payer: COMMERCIAL

## 2023-11-14 NOTE — TELEPHONE ENCOUNTER
Caller: Oliver Alonso    Relationship: Self    Best call back number: [phone]    Which medication are you concerned about: VIMPAT    Who prescribed you this medication: DR. CORRAL    When did you start taking this medication: 3 WEEKS AGO    What are your concerns: PATIENT IS STARTING TO HAVE HALLUCINATIONS AT NIGHT SINCE BEING ON THIS MEDICINE. HE IS FINE DURING THE DAY BUT NOT AT NIGHT.    How long have you had these concerns: SINCE SUNDAY

## 2023-11-16 NOTE — TELEPHONE ENCOUNTER
Pt wife stated he did not have any issues last night. Do you want him to continue on 100mg Vimpat BID and see if those side effects have subsided?     Pt wife asked if cutting night dose in half might help?  Stated he has not had any seizure like activity. Please advise and I can call them back.

## 2023-11-20 NOTE — TELEPHONE ENCOUNTER
Carlo, would you check on this referral. It was ordered 10/23 and they have not heard anything and are having medication issues and need to be seen. Thank you.

## 2023-11-20 NOTE — TELEPHONE ENCOUNTER
Caller: Oliver Alonso    Relationship: Self    Best call back number: [phone]    What was the call regarding: PATIENT'S WIFE CALLED WITH AN UPDATE FROM THE WEEKEND. SHE STATED THAT THE HALLUCINATIONS CONTINUED THROUGH THE WEEKEND. IT ONLY HAPPENS AT NIGHT AND THEY WANT TO KNOW WHAT THE NEXT STEPS TO HELP THE PATIENT WILL BE.     PLEASE REVIEW  THANK YOU

## 2023-11-20 NOTE — TELEPHONE ENCOUNTER
Spoke with pt wife on the phone. Let her know Dr. Cleary wants him to take 1 Vimpat per day for 1 week and then stop medication. Pt was not able to tolerate Keppra either.     Has not heard from  yet.

## 2023-11-22 ENCOUNTER — TELEPHONE (OUTPATIENT)
Dept: NEUROLOGY | Facility: CLINIC | Age: 58
End: 2023-11-22

## 2023-11-22 NOTE — TELEPHONE ENCOUNTER
Caller: [facility]    Relationship: Emergency Contact    Best call back number: [phone]    Which medication are you concerned about:   lacosamide (VIMPAT) 100 MG tablet tablet   PT IS BEING WEANED OFF THIS RX.    Who prescribed you this medication:   DR CORRAL    When did you start taking this medication:   3 OR 4 WEEKS    What are your concerns: PT STARTED HAVING HALLUCINATIONS DURING THE NIGHT AND PT HAS BEEN FOLLOWING THE WEANING INSTRUCTIONS TO COME OFF THE VIMPAT.    PT WILL BE OFF THE RX BY 11-27-23.    [facility] IS ASKING WHAT RX WILL THE PT BE TAKING TO REPLACE THE VIMPAT TO PREVENT SEIZURES..    PLEASE CALL [facility] BACK.

## 2023-11-22 NOTE — TELEPHONE ENCOUNTER
Called [facility], they state that they still haven't received the referral from us but they did have a referral for the same dx and that it's still being triaged by the office.

## 2023-11-27 ENCOUNTER — TELEPHONE (OUTPATIENT)
Dept: NEUROSURGERY | Facility: CLINIC | Age: 58
End: 2023-11-27
Payer: COMMERCIAL

## 2023-11-28 ENCOUNTER — OFFICE VISIT (OUTPATIENT)
Dept: NEUROLOGY | Facility: CLINIC | Age: 58
End: 2023-11-28
Payer: COMMERCIAL

## 2023-11-28 VITALS
DIASTOLIC BLOOD PRESSURE: 80 MMHG | HEART RATE: 64 BPM | SYSTOLIC BLOOD PRESSURE: 136 MMHG | BODY MASS INDEX: 30.27 KG/M2 | WEIGHT: 217 LBS

## 2023-11-28 DIAGNOSIS — G45.9 TIA (TRANSIENT ISCHEMIC ATTACK): Primary | ICD-10-CM

## 2023-11-28 DIAGNOSIS — G31.83 MILD LEWY BODY DEMENTIA WITHOUT BEHAVIORAL DISTURBANCE, PSYCHOTIC DISTURBANCE, MOOD DISTURBANCE, OR ANXIETY: ICD-10-CM

## 2023-11-28 DIAGNOSIS — G47.52 REM BEHAVIORAL DISORDER: ICD-10-CM

## 2023-11-28 DIAGNOSIS — F02.A0 MILD LEWY BODY DEMENTIA WITHOUT BEHAVIORAL DISTURBANCE, PSYCHOTIC DISTURBANCE, MOOD DISTURBANCE, OR ANXIETY: ICD-10-CM

## 2023-11-28 PROBLEM — F02.80 LEWY BODY DEMENTIA: Status: ACTIVE | Noted: 2023-11-28

## 2023-11-28 PROBLEM — G40.109 LOCALIZATION-RELATED SYMPTOMATIC EPILEPSY AND EPILEPTIC SYNDROMES WITH SIMPLE PARTIAL SEIZURES, NOT INTRACTABLE, WITHOUT STATUS EPILEPTICUS: Status: RESOLVED | Noted: 2023-10-23 | Resolved: 2023-11-28

## 2023-11-28 RX ORDER — MEMANTINE HYDROCHLORIDE 5 MG/1
TABLET ORAL
Qty: 60 TABLET | Refills: 5 | Status: SHIPPED | OUTPATIENT
Start: 2023-11-28

## 2023-11-28 NOTE — ASSESSMENT & PLAN NOTE
I discussed with him and his family that my diagnosis is probable Lewy body dementia.  He is to continue taking memantine which I stopped giving him last month since the diagnosis of dementia was not made at that time.  I discussed with him that the visual hallucinations is typical of Lewy body dementia as well as the REM behavior disorder.  I will leave it to his sleep medicine physician to give him medication such as clonazepam to suppress the REM behavior disorder if it is indeed present.  I discussed with him that I will refer him to [facility] or [facility] for second opinion.    We discussed regarding abstaining from driving since his reaction time is slower according to his family as well as from examining him.  He is agreeable to this.

## 2023-11-28 NOTE — PROGRESS NOTES
Chief Complaint  No chief complaint on file.    Subjective          Oliver Alonso is a 58 y.o. male who presents to [facility] NEUROLOGY & NEUROSURGERY  History of Present Illness  58-year-old man here for follow-up of his memory problems as well as visual hallucinations.  On his last clinic visit the diagnosis was geared towards seizures since he had episodes of loss of recollection for time and events in which she did not remember what he was doing and made mistakes.  He told me that only lasted for minutes at a time.  There are times in which his wife talks to him and he loses his train of thought for 15 seconds.  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  There is [place].  He had visual hallucinations as well.  His wife at that time stated that there was nothing wrong with his memory.  I started him on Vimpat in which his hallucinations got worse and then since has been on taking 1 a day of 100 mg he has not had hallucinations for 4 days.  Is only occurring at night.  Denies hallucination was that he wears a CPAP machine and he was fighting the CPAP machine and almost tore it down since he thought it was a snake.  He was so vivid.  His wife and him do not sleep in the same bedroom.  There is another time in which she had a vivid dream about his brother as though his brother had  and he was ready to call 911 however could not do it since he was asleep.  Woke up from that and was looking for his phone.    He states that he has significant memory problems that he asked evaluated by his primary care.  He is the  for bank owns a farm as well.  He has not been driving.  He tells me that he cannot remember the passages of his Bible.  He is read something of forget it.  If he tells us he is family something he  may not remember it.  His daughter as well as his wife is in the room with him today.    He is being followed by sleep medicine for narcolepsy.    Had an episode 4 days ago in which he was picking up logs for [employer] and ultrasound he got dizzy and he felt like his left side got numb including his face and arm.  It felt that way for a whole day and did not sense that it is lasted for for few hours.  He is scheduled to have a ALFONSO tomorrow being performed by cardiology in [place].  He is CT angiogram of the head and neck was denied by his insurance.    MRI of the brain shows no acute findings.  There is chronic encephalomalacia in the right temporoparietal region just above the tentorium.  Longstanding and perhaps congenital abnormality suspected.    Objective   Vital Signs:   /80 (BP Location: Left arm)   Pulse 64   Wt 98.4 kg (217 lb)   BMI 30.27 kg/m²     Physical Exam   He is alert, fluent, phasic, follows commands well.  His Mini-Mental status score was 27 out of 30.  He missed 3 out of 3 recent recall even though I told him to make a sentence.  On the second attempt after I told him the 3 words he was able to get 2 out of the 3 recent recall after 5 minutes.  Clock drawing took him a long time to finish the hands of her clock which was 11:10 but he got it correctly.    Heart is regular and rhythm normal in rate        Assessment and Plan  Diagnoses and all orders for this visit:    1. TIA (transient ischemic attack) (Primary)  Assessment & Plan:  His insurance denied the CTA of the head and neck.  He is scheduled to have a ALFONSO tomorrow.  I discussed with him that I will order the CT angiogram of the head and neck as urgent since he had an event again that occurred 4 days ago.  He is to continue taking Eliquis 5 mg every 12 hours and is also taking baby aspirin at this time.    Orders:  -     CT Angiogram Head; Future  -     CT Angiogram Neck With & Without Contrast; Future    2. REM behavioral  disorder  Assessment & Plan:  He has a sleep medicine provider that has diagnosed him to have narcolepsy.  I discussed with him that this events of vivid dreams and nightmares.  To me this is secondary to REM behavior disorder.  I do not think he has hypnopompic and hypnagogic hallucinations.      3. Mild Lewy body dementia without behavioral disturbance, psychotic disturbance, mood disturbance, or anxiety  Assessment & Plan:  I discussed with him and his family that my diagnosis is probable Lewy body dementia.  He is to continue taking memantine which I stopped giving him last month since the diagnosis of dementia was not made at that time.  I discussed with him that the visual hallucinations is typical of Lewy body dementia as well as the REM behavior disorder.  I will leave it to his sleep medicine physician to give him medication such as clonazepam to suppress the REM behavior disorder if it is indeed present.  I discussed with him that I will refer him to [facility] or [facility] for second opinion.    We discussed regarding abstaining from driving since his reaction time is slower according to his family as well as from examining him.  He is agreeable to this.    Orders:  -     Ambulatory Referral to Neurology  -     Ambulatory Referral to Neurology    Other orders  -     memantine (NAMENDA) 5 MG tablet; Take 2 tablets twice a day.  Dispense: 60 tablet; Refill: 5         Total time spent with the patient and coordinating patient care was 50 minutes.    Follow Up  No follow-ups on file.  Patient was given instructions and counseling regarding his condition or for health maintenance advice. Please see specific information pulled into the AVS if appropriate.

## 2023-11-28 NOTE — ASSESSMENT & PLAN NOTE
He has a sleep medicine provider that has diagnosed him to have narcolepsy.  I discussed with him that this events of vivid dreams and nightmares.  To me this is secondary to REM behavior disorder.  I do not think he has hypnopompic and hypnagogic hallucinations.

## 2023-11-28 NOTE — ASSESSMENT & PLAN NOTE
His insurance denied the CTA of the head and neck.  He is scheduled to have a ALFONSO tomorrow.  I discussed with him that I will order the CT angiogram of the head and neck as urgent since he had an event again that occurred 4 days ago.  He is to continue taking Eliquis 5 mg every 12 hours and is also taking baby aspirin at this time.

## 2023-11-30 ENCOUNTER — HOSPITAL ENCOUNTER (OUTPATIENT)
Dept: CT IMAGING | Facility: HOSPITAL | Age: 58
Discharge: HOME | End: 2023-11-30
Admitting: PSYCHIATRY & NEUROLOGY
Payer: COMMERCIAL

## 2023-11-30 DIAGNOSIS — G45.9 TIA (TRANSIENT ISCHEMIC ATTACK): ICD-10-CM

## 2023-11-30 LAB
CREAT BLDA-MCNC: 1.3 MG/DL
EGFRCR SERPLBLD CKD-EPI 2021: 63.3 ML/MIN/1.73

## 2023-11-30 PROCEDURE — 70496 CT ANGIOGRAPHY HEAD: CPT

## 2023-11-30 PROCEDURE — 70498 CT ANGIOGRAPHY NECK: CPT

## 2023-11-30 PROCEDURE — 82565 ASSAY OF CREATININE: CPT

## 2023-11-30 PROCEDURE — 25510000001 IOPAMIDOL PER 1 ML: Performed by: PSYCHIATRY & NEUROLOGY

## 2023-11-30 RX ADMIN — IOPAMIDOL 100 ML: 755 INJECTION, SOLUTION INTRAVENOUS at 16:30

## 2024-02-05 ENCOUNTER — TELEPHONE (OUTPATIENT)
Dept: NEUROLOGY | Facility: CLINIC | Age: 59
End: 2024-02-05
Payer: COMMERCIAL

## 2024-06-04 ENCOUNTER — OFFICE VISIT (OUTPATIENT)
Dept: PULMONOLOGY | Facility: CLINIC | Age: 59
End: 2024-06-04
Payer: COMMERCIAL

## 2024-06-04 VITALS
SYSTOLIC BLOOD PRESSURE: 131 MMHG | TEMPERATURE: 97.6 F | HEIGHT: 71 IN | DIASTOLIC BLOOD PRESSURE: 75 MMHG | HEART RATE: 65 BPM | OXYGEN SATURATION: 96 % | WEIGHT: 210.8 LBS | RESPIRATION RATE: 16 BRPM | BODY MASS INDEX: 29.51 KG/M2

## 2024-06-04 DIAGNOSIS — J18.9 RECURRENT PNEUMONIA: ICD-10-CM

## 2024-06-04 DIAGNOSIS — J45.40 MODERATE PERSISTENT ASTHMA WITHOUT COMPLICATION: Chronic | ICD-10-CM

## 2024-06-04 DIAGNOSIS — D86.0 PULMONARY SARCOIDOSIS: Primary | ICD-10-CM

## 2024-06-04 PROCEDURE — 99214 OFFICE O/P EST MOD 30 MIN: CPT | Performed by: NURSE PRACTITIONER

## 2024-06-04 RX ORDER — PREDNISONE 10 MG/1
TABLET ORAL
COMMUNITY
Start: 2024-05-03 | End: 2024-06-04

## 2024-06-04 RX ORDER — GABAPENTIN 100 MG/1
CAPSULE ORAL
COMMUNITY

## 2024-06-04 RX ORDER — IPRATROPIUM BROMIDE AND ALBUTEROL SULFATE 2.5; .5 MG/3ML; MG/3ML
SOLUTION RESPIRATORY (INHALATION)
COMMUNITY
Start: 2024-04-19

## 2024-06-04 RX ORDER — DIPHENOXYLATE HYDROCHLORIDE AND ATROPINE SULFATE 2.5; .025 MG/1; MG/1
TABLET ORAL
COMMUNITY

## 2024-06-04 RX ORDER — FLUTICASONE PROPIONATE 50 MCG
1 SPRAY, SUSPENSION (ML) NASAL
COMMUNITY

## 2024-06-04 RX ORDER — TAMSULOSIN HYDROCHLORIDE 0.4 MG/1
CAPSULE ORAL
COMMUNITY
Start: 2024-05-21

## 2024-06-04 RX ORDER — CIPROFLOXACIN 500 MG/1
1 TABLET, FILM COATED ORAL EVERY 12 HOURS SCHEDULED
COMMUNITY
Start: 2024-05-30 | End: 2024-06-04

## 2024-06-04 RX ORDER — ROSUVASTATIN CALCIUM 40 MG/1
TABLET, COATED ORAL
COMMUNITY
Start: 2024-05-21

## 2024-06-04 RX ORDER — EZETIMIBE 10 MG/1
TABLET ORAL
COMMUNITY

## 2024-06-04 RX ORDER — GINSENG 100 MG
CAPSULE ORAL
COMMUNITY

## 2024-06-04 RX ORDER — PREDNISONE 5 MG/1
TABLET ORAL
COMMUNITY
Start: 2024-05-21

## 2024-06-04 RX ORDER — MEMANTINE HYDROCHLORIDE 10 MG/1
10 TABLET ORAL
COMMUNITY

## 2024-06-04 RX ORDER — CLOPIDOGREL BISULFATE 75 MG/1
1 TABLET ORAL DAILY
COMMUNITY
Start: 2024-05-21

## 2024-06-04 RX ORDER — DOXYCYCLINE 100 MG/1
1 CAPSULE ORAL EVERY 12 HOURS SCHEDULED
COMMUNITY
Start: 2024-05-29 | End: 2024-06-04

## 2024-06-04 RX ORDER — METOPROLOL SUCCINATE 25 MG/1
25 TABLET, EXTENDED RELEASE ORAL
COMMUNITY

## 2024-06-04 NOTE — PROGRESS NOTES
Primary Care Provider  Concepcion Rust MD     Referring Provider  No ref. provider found     Chief Complaint  Shortness of Breath (With exertion ), Follow-up, and Asthma (CT Results. )    Subjective          Oliver Alonso presents to [facility] PULMONARY & CRITICAL CARE MEDICINE  History of Present Illness  Oliver Alonso is a 58 y.o. male patient of Dr. Redd here for management of pulmonary sarcoidosis, asthma, shortness of breath and recurrent pneumonia.    Patient states he is doing okay since his last office visit.  Unfortunately, he did have a stroke last year and has left-sided residual weakness.  He is currently on antibiotics.  Patient states that he has been having recurrent pneumonia on the left side of his chest and does question if it is related to his previous stroke..  He denies any current fevers or chills.  He continues to take Trelegy daily.  He has tried Breztri and other inhalers in the past and they have not worked for him.  He uses his DuoNebs twice a day.  He is on 5 mg of prednisone for his sarcoidosis.  Patient recently had a chest CT on 6/3/2024.  This does show mild reticulonodular lung disease consistent with patient's diagnosis of sarcoidosis.  There are no new infiltrates seen and no worsening adenopathy.  There are small nonobstructing kidney stones seen bilaterally.  I discussed these findings with patient today in office.  He continues to wear a BiPAP and is benefiting from its use.  Overall, they have no additional questions at this time.  He does have an incentive spirometer and I will provide them with a flutter valve today.  He is able to perform his ADLs without difficulty.  He is up-to-date with his COVID and flu vaccines.     His history of smoking is   Tobacco Use: Low Risk  (6/4/2024)    Patient History     Smoking Tobacco Use: Never     Smokeless Tobacco Use: Never     Passive Exposure: Never   .    Review of Systems   Constitutional:  Negative for chills,  fatigue, fever, unexpected weight gain and unexpected weight loss.   HENT:  Congestion: Nasal.    Respiratory:  Positive for shortness of breath. Negative for apnea, cough and wheezing.         Negative for Hemoptysis     Cardiovascular:  Negative for chest pain, palpitations and leg swelling.   Skin:         Negative for cyanosis      Sleep: Negative for Excessive daytime sleepiness  Negative for morning headaches  Negative for Snoring    Family History   Family history unknown: Yes        Social History     Socioeconomic History    Marital status:    Tobacco Use    Smoking status: Never     Passive exposure: Never    Smokeless tobacco: Never   Vaping Use    Vaping status: Never Used   Substance and Sexual Activity    Alcohol use: Defer    Drug use: Defer    Sexual activity: Defer        Past Medical History:   Diagnosis Date    Pulmonary sarcoidosis     TIA (transient ischemic attack)         Immunization History   Administered Date(s) Administered    BCG 09/16/2015    COVID-19 (MODERNA) 1st,2nd,3rd Dose Monovalent 03/17/2021, 09/17/2021    Flu Vaccine Split Quad 10/18/2017    Fluzone (or Fluarix & Flulaval for VFC) >6mos 12/18/2019, 10/11/2022    Influenza, Unspecified 10/05/2021    flucelvax quad pfs =>4 YRS 01/14/2019, 09/30/2021         Allergies   Allergen Reactions    Cephalexin Unknown - Low Severity    Cephalosporins Unknown - Low Severity    Ezetimibe Unknown - Low Severity    Levofloxacin Unknown - Low Severity    Morphine And Codeine Unknown - Low Severity    Nifedipine Unknown - Low Severity    Penicillins Unknown - Low Severity    Trandolapril Unknown - Low Severity          Current Outpatient Medications:     amLODIPine (NORVASC) 5 MG tablet, Take 1 tablet by mouth 2 (Two) Times a Day., Disp: , Rfl:     aspirin 81 MG EC tablet, Take 1 tablet by mouth Daily. NOT TAKING, Disp: , Rfl:     cyanocobalamin (VITAMIN B-12) 1000 MCG tablet, Take 1 tablet by mouth Daily., Disp: , Rfl:     Eliquis 5 MG  tablet tablet, Take 1 tablet by mouth Every 12 (Twelve) Hours., Disp: , Rfl:     ezetimibe (ZETIA) 10 MG tablet, TAKE ONE TABLET BY MOUTH EACH NIGHT AT BEDTIME, Disp: , Rfl:     Farxiga 5 MG tablet tablet, Take 1 tablet by mouth Daily., Disp: , Rfl:     Fluticasone-Umeclidin-Vilant (Trelegy Ellipta) 200-62.5-25 MCG/ACT inhaler, Inhale 1 puff Daily., Disp: 2 each, Rfl: 0    folic acid (FOLVITE) 1 MG tablet, Take 1 tablet by mouth Daily., Disp: , Rfl:     gabapentin (NEURONTIN) 100 MG capsule, TAKE ONE CAPSULE BY MOUTH EACH NIGHT AT BEDTIME, Disp: , Rfl:     ipratropium-albuterol (DUO-NEB) 0.5-2.5 mg/3 ml nebulizer, USE one NEBULIZER vial FOUR TIMES DAILY as needed FOR wheezing, Disp: , Rfl:     losartan (COZAAR) 50 MG tablet, Take 1 tablet by mouth 2 (Two) Times a Day., Disp: , Rfl:     memantine (NAMENDA) 10 MG tablet, Take 1 tablet by mouth., Disp: , Rfl:     methylphenidate (RITALIN) 10 MG tablet, Take 1 tablet by mouth 2 (Two) Times a Day., Disp: , Rfl:     metoprolol succinate XL (TOPROL-XL) 50 MG 24 hr tablet, Take 0.5 tablets by mouth 2 (Two) Times a Day., Disp: , Rfl:     multivitamin (Multi Vitamin Daily) tablet tablet, Take  by mouth., Disp: , Rfl:     predniSONE (DELTASONE) 5 MG tablet, , Disp: , Rfl:     rosuvastatin (CRESTOR) 40 MG tablet, , Disp: , Rfl:     tamsulosin (FLOMAX) 0.4 MG capsule 24 hr capsule, , Disp: , Rfl:     vitamin d 125 MCG (5000 UT) capsule, Take 1 capsule by mouth Daily., Disp: , Rfl:     Zinc 50 MG tablet, Take  by mouth., Disp: , Rfl:     clopidogrel (PLAVIX) 75 MG tablet, Take 1 tablet by mouth Daily. (Patient not taking: Reported on 6/4/2024), Disp: , Rfl:     fluticasone (FLONASE) 50 MCG/ACT nasal spray, 1 spray into the nostril(s) as directed by provider. (Patient not taking: Reported on 6/4/2024), Disp: , Rfl:     Fluticasone-Umeclidin-Vilant (TRELEGY ELLIPTA) 200-62.5-25 MCG/ACT inhaler, Inhale 1 puff Daily., Disp: 60 each, Rfl: 11    memantine (NAMENDA) 5 MG tablet, Take 2  "tablets twice a day. (Patient not taking: Reported on 6/4/2024), Disp: 60 tablet, Rfl: 5    metoprolol succinate XL (TOPROL-XL) 25 MG 24 hr tablet, Take 1 tablet by mouth. (Patient not taking: Reported on 6/4/2024), Disp: , Rfl:      Objective   Physical Exam  Constitutional:       General: He is not in acute distress.     Appearance: Normal appearance. He is normal weight.   HENT:      Right Ear: Hearing normal.      Left Ear: Hearing normal.      Nose: No nasal tenderness or congestion.      Mouth/Throat:      Mouth: Mucous membranes are moist. No oral lesions.   Eyes:      Extraocular Movements: Extraocular movements intact.      Pupils: Pupils are equal, round, and reactive to light.   Cardiovascular:      Rate and Rhythm: Normal rate and regular rhythm.      Pulses: Normal pulses.      Heart sounds: Normal heart sounds. No murmur heard.  Pulmonary:      Effort: Pulmonary effort is normal.      Breath sounds: Normal breath sounds. Examination of the left-upper field reveals rhonchi. Examination of the left-lower field reveals rhonchi. Rhonchi present. No wheezing or rales.   Musculoskeletal:      Right lower leg: No edema.      Left lower leg: No edema.   Skin:     General: Skin is warm and dry.      Findings: No lesion or rash.   Neurological:      General: No focal deficit present.      Mental Status: He is alert and oriented to person, place, and time.   Psychiatric:         Mood and Affect: Affect normal. Mood is not anxious or depressed.         Vital Signs:   /75 (BP Location: Left arm, Patient Position: Sitting, Cuff Size: Large Adult)   Pulse 65   Temp 97.6 °F (36.4 °C) (Oral)   Resp 16   Ht 180.3 cm (71\")   Wt 95.6 kg (210 lb 12.8 oz)   SpO2 96% Comment: RA  BMI 29.40 kg/m²        Result Review :   The following data was reviewed by: RAKESH Yates on 06/04/2024:    Data reviewed : Radiologic studies chest CT 6/3/2024, pulmonary function test 2/8/2023 and Dr. Redd last office note  "   Procedures        Assessment and Plan    Diagnoses and all orders for this visit:    1. Pulmonary sarcoidosis (Primary)  Comments:  stable.  Continue Trelegy  Orders:  -     Fluticasone-Umeclidin-Vilant (Trelegy Ellipta) 200-62.5-25 MCG/ACT inhaler; Inhale 1 puff Daily.  Dispense: 2 each; Refill: 0  -     Fluticasone-Umeclidin-Vilant (TRELEGY ELLIPTA) 200-62.5-25 MCG/ACT inhaler; Inhale 1 puff Daily.  Dispense: 60 each; Refill: 11    2. Recurrent pneumonia  Comments:  Continue current dose of antibiotics and steroids.  Flutter valve given  Orders:  -     Fluticasone-Umeclidin-Vilant (Trelegy Ellipta) 200-62.5-25 MCG/ACT inhaler; Inhale 1 puff Daily.  Dispense: 2 each; Refill: 0  -     Fluticasone-Umeclidin-Vilant (TRELEGY ELLIPTA) 200-62.5-25 MCG/ACT inhaler; Inhale 1 puff Daily.  Dispense: 60 each; Refill: 11    3. Moderate persistent asthma without complication  Comments:  Continue Trelegy  Orders:  -     Fluticasone-Umeclidin-Vilant (Trelegy Ellipta) 200-62.5-25 MCG/ACT inhaler; Inhale 1 puff Daily.  Dispense: 2 each; Refill: 0  -     Fluticasone-Umeclidin-Vilant (TRELEGY ELLIPTA) 200-62.5-25 MCG/ACT inhaler; Inhale 1 puff Daily.  Dispense: 60 each; Refill: 11          Follow Up   Return in about 6 months (around 12/4/2024) for Recheck.  Patient was given instructions and counseling regarding his condition or for health maintenance advice. Please see specific information pulled into the AVS if appropriate.

## 2024-12-31 ENCOUNTER — OFFICE VISIT (OUTPATIENT)
Dept: PULMONOLOGY | Facility: CLINIC | Age: 59
End: 2024-12-31
Payer: COMMERCIAL

## 2024-12-31 VITALS
HEIGHT: 71 IN | WEIGHT: 226 LBS | HEART RATE: 63 BPM | TEMPERATURE: 96.8 F | BODY MASS INDEX: 31.64 KG/M2 | SYSTOLIC BLOOD PRESSURE: 141 MMHG | RESPIRATION RATE: 16 BRPM | OXYGEN SATURATION: 96 % | DIASTOLIC BLOOD PRESSURE: 82 MMHG

## 2024-12-31 DIAGNOSIS — J45.20 MILD INTERMITTENT ASTHMA WITHOUT COMPLICATION: ICD-10-CM

## 2024-12-31 DIAGNOSIS — R06.09 DYSPNEA ON EXERTION: ICD-10-CM

## 2024-12-31 DIAGNOSIS — D86.0 PULMONARY SARCOIDOSIS: ICD-10-CM

## 2024-12-31 RX ORDER — GLIMEPIRIDE 4 MG/1
TABLET ORAL
COMMUNITY
Start: 2024-11-15

## 2024-12-31 RX ORDER — AMLODIPINE BESYLATE 10 MG/1
0.5 TABLET ORAL EVERY 12 HOURS SCHEDULED
COMMUNITY
Start: 2024-11-15

## 2024-12-31 RX ORDER — LANCETS
1 EACH MISCELLANEOUS DAILY
COMMUNITY
Start: 2024-11-01

## 2024-12-31 NOTE — PROGRESS NOTES
Primary Care Provider  Concepcion Rust MD     Referring Provider  No ref. provider found     Chief Complaint  Pulmonary sarcoidosis, Follow-up (6 Month), and Cough (Due to fungal infection. Finished antibiotic 10-12 days ago /Sometimes productive, yellow /)    Subjective          Oliver Alonso presents to [facility] PULMONARY & CRITICAL CARE MEDICINE  History of Present Illness  Oliver Alonso is a 59 y.o. male patient of Dr. Redd here for management of pulmonary sarcoidosis, asthma and shortness of breath.     Patient states he is doing okay since his last office visit. He denies any current fevers or chills.  He states he did recently finished antibiotic due to an abnormal sputum sample that was given to his primary care provider.  We will request these records.  His shortness of breath is mild in severity, worse with exertion and improved with rest.  He continues to take Trelegy daily. He is on 2.5 mg of prednisone for his sarcoidosis.  Continues wear a BiPAP and benefits from its use.  Patient states that he does have a fullness on his left side of his chest.  Patient states when he checks his oxygen before bed, he will be in the low 90s.  He has never been checked for daytime oxygen.     His history of smoking is   Tobacco Use: Low Risk  (12/31/2024)    Patient History     Smoking Tobacco Use: Never     Smokeless Tobacco Use: Never     Passive Exposure: Never   .    Review of Systems   Constitutional:  Negative for chills, fatigue, fever, unexpected weight gain and unexpected weight loss.   HENT:  Congestion: Nasal.    Respiratory:  Positive for cough and shortness of breath. Negative for apnea and wheezing.         Negative for Hemoptysis     Cardiovascular:  Negative for chest pain, palpitations and leg swelling.   Skin:         Negative for cyanosis      Sleep: Negative for Excessive daytime sleepiness  Negative for morning headaches  Negative for Snoring    Family History   Family history  unknown: Yes        Social History     Socioeconomic History    Marital status:    Tobacco Use    Smoking status: Never     Passive exposure: Never    Smokeless tobacco: Never   Vaping Use    Vaping status: Never Used   Substance and Sexual Activity    Alcohol use: Defer    Drug use: Defer    Sexual activity: Defer        Past Medical History:   Diagnosis Date    Pulmonary sarcoidosis     TIA (transient ischemic attack)         Immunization History   Administered Date(s) Administered    BCG 09/16/2015    COVID-19 (MODERNA) 1st,2nd,3rd Dose Monovalent 03/17/2021, 09/17/2021    Flu Vaccine Split Quad 10/18/2017    Fluzone  >6mos 10/08/2024    Fluzone (or Fluarix & Flulaval for VFC) >6mos 12/18/2019, 10/11/2022    Influenza, Unspecified 10/05/2021    flucelvax quad pfs =>4 YRS 01/14/2019, 09/30/2021         Allergies   Allergen Reactions    Loracarbef Hives    Sulfa Antibiotics Itching    Cephalexin Unknown - Low Severity    Cephalosporins Unknown - Low Severity    Ezetimibe Unknown - Low Severity    Levofloxacin Unknown - Low Severity    Morphine And Codeine Unknown - Low Severity    Nifedipine Unknown - Low Severity    Penicillins Unknown - Low Severity    Trandolapril Unknown - Low Severity          Current Outpatient Medications:     Accu-Chek Softclix Lancets lancets, 1 each by Other route Daily., Disp: , Rfl:     amLODIPine (NORVASC) 10 MG tablet, Take 0.5 tablets by mouth Every 12 (Twelve) Hours., Disp: , Rfl:     aspirin 81 MG EC tablet, Take 1 tablet by mouth Daily. NOT TAKING, Disp: , Rfl:     cyanocobalamin (VITAMIN B-12) 1000 MCG tablet, Take 1 tablet by mouth Daily., Disp: , Rfl:     Eliquis 5 MG tablet tablet, Take 1 tablet by mouth Every 12 (Twelve) Hours., Disp: , Rfl:     ezetimibe (ZETIA) 10 MG tablet, TAKE ONE TABLET BY MOUTH EACH NIGHT AT BEDTIME, Disp: , Rfl:     Farxiga 5 MG tablet tablet, Take 1 tablet by mouth Daily., Disp: , Rfl:     fluticasone (FLONASE) 50 MCG/ACT nasal spray,  Administer 1 spray into the nostril(s) as directed by provider., Disp: , Rfl:     Fluticasone-Umeclidin-Vilant (TRELEGY ELLIPTA) 200-62.5-25 MCG/ACT inhaler, Inhale 1 puff Daily., Disp: 60 each, Rfl: 11    folic acid (FOLVITE) 1 MG tablet, Take 1 tablet by mouth Daily., Disp: , Rfl:     gabapentin (NEURONTIN) 100 MG capsule, TAKE ONE CAPSULE BY MOUTH EACH NIGHT AT BEDTIME, Disp: , Rfl:     glimepiride (AMARYL) 4 MG tablet, TAKE ONE TABLET BY MOUTH EVERY MORNING OR as directed, Disp: , Rfl:     glucose blood test strip, 1 each by Other route Daily., Disp: , Rfl:     ipratropium-albuterol (DUO-NEB) 0.5-2.5 mg/3 ml nebulizer, USE one NEBULIZER vial FOUR TIMES DAILY as needed FOR wheezing, Disp: , Rfl:     losartan (COZAAR) 50 MG tablet, Take 1 tablet by mouth 2 (Two) Times a Day., Disp: , Rfl:     memantine (NAMENDA) 10 MG tablet, Take 1 tablet by mouth., Disp: , Rfl:     methylphenidate (RITALIN) 10 MG tablet, Take 1 tablet by mouth 2 (Two) Times a Day., Disp: , Rfl:     metoprolol succinate XL (TOPROL-XL) 50 MG 24 hr tablet, Take 0.5 tablets by mouth 2 (Two) Times a Day., Disp: , Rfl:     multivitamin (Multi Vitamin Daily) tablet tablet, Take 0.5 tablets by mouth., Disp: , Rfl:     predniSONE (DELTASONE) 5 MG tablet, , Disp: , Rfl:     rosuvastatin (CRESTOR) 40 MG tablet, , Disp: , Rfl:     tamsulosin (FLOMAX) 0.4 MG capsule 24 hr capsule, , Disp: , Rfl:     vitamin d 125 MCG (5000 UT) capsule, Take 1 capsule by mouth Daily., Disp: , Rfl:     Zinc 50 MG tablet, Take  by mouth., Disp: , Rfl:     amLODIPine (NORVASC) 5 MG tablet, Take 1 tablet by mouth 2 (Two) Times a Day. (Patient not taking: Reported on 12/31/2024), Disp: , Rfl:     clopidogrel (PLAVIX) 75 MG tablet, Take 1 tablet by mouth Daily. (Patient not taking: Reported on 6/4/2024), Disp: , Rfl:     Fluticasone-Umeclidin-Vilant (Trelegy Ellipta) 200-62.5-25 MCG/ACT inhaler, Inhale 1 puff Daily. (Patient not taking: Reported on 12/31/2024), Disp: 2 each, Rfl:  "0    Fluticasone-Umeclidin-Vilant (TRELEGY ELLIPTA) 200-62.5-25 MCG/ACT inhaler, Inhale 1 puff Daily., Disp: 60 each, Rfl: 11    memantine (NAMENDA) 5 MG tablet, Take 2 tablets twice a day. (Patient not taking: Reported on 6/4/2024), Disp: 60 tablet, Rfl: 5    metoprolol succinate XL (TOPROL-XL) 25 MG 24 hr tablet, Take 1 tablet by mouth. (Patient not taking: Reported on 12/31/2024), Disp: , Rfl:      Objective   Physical Exam  Constitutional:       General: He is not in acute distress.     Appearance: Normal appearance. He is normal weight.   HENT:      Right Ear: Hearing normal.      Left Ear: Hearing normal.      Nose: No nasal tenderness or congestion.      Mouth/Throat:      Mouth: Mucous membranes are moist. No oral lesions.   Eyes:      Extraocular Movements: Extraocular movements intact.      Pupils: Pupils are equal, round, and reactive to light.   Cardiovascular:      Rate and Rhythm: Normal rate and regular rhythm.      Pulses: Normal pulses.      Heart sounds: Normal heart sounds. No murmur heard.  Pulmonary:      Effort: Pulmonary effort is normal.      Breath sounds: Normal breath sounds. No wheezing, rhonchi or rales.   Musculoskeletal:      Right lower leg: No edema.      Left lower leg: No edema.   Skin:     General: Skin is warm and dry.      Findings: No lesion or rash.   Neurological:      General: No focal deficit present.      Mental Status: He is alert and oriented to person, place, and time.   Psychiatric:         Mood and Affect: Affect normal. Mood is not anxious or depressed.         Vital Signs:   /82 (BP Location: Left arm, Patient Position: Sitting, Cuff Size: Large Adult)   Pulse 63   Temp 96.8 °F (36 °C) (Temporal)   Resp 16   Ht 180.3 cm (71\")   Wt 103 kg (226 lb)   SpO2 96% Comment: Room air  BMI 31.52 kg/m²        Result Review :   The following data was reviewed by: RAKESH Yates on 12/31/2024:    Data reviewed : Pulmonary function test 2/8/2023 and my last " office note    Procedures        Assessment and Plan    Diagnoses and all orders for this visit:    1. Mild intermittent asthma without complication  Comments:  Continue Trelegy  Orders:  -     Fluticasone-Umeclidin-Vilant (TRELEGY ELLIPTA) 200-62.5-25 MCG/ACT inhaler; Inhale 1 puff Daily.  Dispense: 60 each; Refill: 11    2. Pulmonary sarcoidosis    3. Dyspnea on exertion  Comments:  Continue albuterol  Orders:  -     6 Minute Walk Test; Future    6-minute walk performed in office today.  Patient does not require daytime oxygen.        Follow Up   Return in about 6 months (around 6/30/2025) for Recheck.  Patient was given instructions and counseling regarding his condition or for health maintenance advice. Please see specific information pulled into the AVS if appropriate.

## 2025-06-19 NOTE — PROGRESS NOTES
Primary Care Provider  Concepcion Rust MD     Referring Provider  No ref. provider found       Patient or patient representative verbalized consent for the use of Ambient Listening during the visit with  RAKESH Yates for chart documentation. 6/24/2025  07:58 EDT    Chief Complaint  Asthma, Cough (Yellow/green mucus - PCP has him on antifungal ), and Follow-up (6 month f/up )    Subjective          Oliver Alonso presents to [facility]  History of Present Illness  Oliver Alonso is a 59 y.o. male patient of Dr. Redd here for management of pulmonary sarcoidosis, asthma and shortness of breath.     Patient continues to use and benefit from his BiPAP    History of Present Illness  The patient is a 59-year-old male who presents for evaluation of a productive cough.    He reports experiencing a productive cough, which he finds unusual. The cough has been intermittent since 2022, with periods of improvement followed by recurrence. He describes a sensation of congestion that is alleviated by exposure to hot steam in the shower, which facilitates expectoration. He has not been outdoors recently and notes that weather changes seem to exacerbate his symptoms. He has been prescribed an antifungal medication for the second time due to a previous diagnosis of fungal infection in his lungs. He has also been using a nebulizer for breathing treatments, which he finds beneficial, although he dislikes the associated increase in heart rate. He is currently on Trelegy, which was approved by his insurance at the start of the year. He has used his albuterol inhaler a few times but does not rely on it as a rescue medication. He is mindful of his activities to avoid breathlessness and paces himself accordingly. He spent several hours outdoors yesterday without any issues.    He takes Ritalin for narcolepsy because he cannot stay awake very well. It is a slow release because the  first time he took it, he felt his heart was going to pound out of his chest.    SOCIAL HISTORY  Exercise: The patient paces himself and avoids activities that get him out of breath.       His history of smoking is   Tobacco Use: Low Risk  (6/24/2025)    Patient History     Smoking Tobacco Use: Never     Smokeless Tobacco Use: Never     Passive Exposure: Never   .    Review of Systems   Constitutional:  Negative for chills, fatigue, fever, unexpected weight gain and unexpected weight loss.   HENT:  Congestion: Nasal.    Respiratory:  Positive for cough and shortness of breath. Negative for apnea and wheezing.         Negative for Hemoptysis     Cardiovascular:  Negative for chest pain, palpitations and leg swelling.   Skin:         Negative for cyanosis      Sleep: Negative for Excessive daytime sleepiness  Negative for morning headaches  Negative for Snoring    Family History   Family history unknown: Yes        Social History     Socioeconomic History    Marital status:    Tobacco Use    Smoking status: Never     Passive exposure: Never    Smokeless tobacco: Never   Vaping Use    Vaping status: Never Used   Substance and Sexual Activity    Alcohol use: Defer    Drug use: Defer    Sexual activity: Defer        Past Medical History:   Diagnosis Date    Pulmonary sarcoidosis     TIA (transient ischemic attack)         Immunization History   Administered Date(s) Administered    BCG 09/16/2015    COVID-19 (MODERNA) 1st,2nd,3rd Dose Monovalent 03/17/2021, 09/17/2021    COVID-19 (MODERNA) BIVALENT 12+YRS 03/17/2021, 09/17/2021    Flu Vaccine Split Quad 10/18/2017    Fluzone  >6mos 10/08/2024    Fluzone (or Fluarix & Flulaval for VFC) >6mos 12/18/2019, 10/11/2022    Influenza, Unspecified 10/05/2021    flucelvax quad pfs =>4 YRS 01/14/2019, 09/30/2021         Allergies   Allergen Reactions    Loracarbef Hives    Sulfa Antibiotics Itching    Cephalexin Unknown - Low Severity    Cephalosporins Unknown - Low Severity     Ezetimibe Unknown - Low Severity    Levofloxacin Unknown - Low Severity    Morphine And Codeine Unknown - Low Severity    Nifedipine Unknown - Low Severity    Penicillins Unknown - Low Severity    Trandolapril Unknown - Low Severity          Current Outpatient Medications:     albuterol (ACCUNEB) 1.25 MG/3ML nebulizer solution, Inhale 3 mL Every 6 (Six) Hours As Needed., Disp: , Rfl:     amLODIPine (NORVASC) 5 MG tablet, Take 1 tablet by mouth 2 (Two) Times a Day., Disp: , Rfl:     aspirin 81 MG EC tablet, Take 1 tablet by mouth Daily. NOT TAKING, Disp: , Rfl:     cyanocobalamin (VITAMIN B-12) 1000 MCG tablet, Take 1 tablet by mouth Daily., Disp: , Rfl:     Eliquis 5 MG tablet tablet, Take 1 tablet by mouth Every 12 (Twelve) Hours., Disp: , Rfl:     ezetimibe (ZETIA) 10 MG tablet, TAKE ONE TABLET BY MOUTH EACH NIGHT AT BEDTIME, Disp: , Rfl:     Farxiga 5 MG tablet tablet, Take 1 tablet by mouth Daily., Disp: , Rfl:     fluticasone (FLONASE) 50 MCG/ACT nasal spray, Administer 1 spray into the nostril(s) as directed by provider., Disp: , Rfl:     Fluticasone-Umeclidin-Vilant (TRELEGY ELLIPTA) 200-62.5-25 MCG/ACT inhaler, Inhale 1 puff Daily., Disp: 60 each, Rfl: 11    Fluticasone-Umeclidin-Vilant (TRELEGY ELLIPTA) 200-62.5-25 MCG/ACT inhaler, Inhale 1 puff Daily., Disp: 60 each, Rfl: 11    folic acid (FOLVITE) 1 MG tablet, Take 1 tablet by mouth Daily., Disp: , Rfl:     gabapentin (NEURONTIN) 100 MG capsule, TAKE ONE CAPSULE BY MOUTH EACH NIGHT AT BEDTIME, Disp: , Rfl:     glimepiride (AMARYL) 4 MG tablet, TAKE ONE TABLET BY MOUTH EVERY MORNING OR as directed, Disp: , Rfl:     losartan (COZAAR) 50 MG tablet, Take 1 tablet by mouth 2 (Two) Times a Day., Disp: , Rfl:     memantine (NAMENDA) 10 MG tablet, Take 1 tablet by mouth., Disp: , Rfl:     methylphenidate (RITALIN) 10 MG tablet, Take 1 tablet by mouth 2 (Two) Times a Day., Disp: , Rfl:     metoprolol succinate XL (TOPROL-XL) 50 MG 24 hr tablet, Take 0.5 tablets  by mouth 2 (Two) Times a Day., Disp: , Rfl:     predniSONE (DELTASONE) 5 MG tablet, , Disp: , Rfl:     rosuvastatin (CRESTOR) 40 MG tablet, , Disp: , Rfl:     tamsulosin (FLOMAX) 0.4 MG capsule 24 hr capsule, , Disp: , Rfl:     terbinafine (lamiSIL) 250 MG tablet, Take 1 tablet by mouth Daily., Disp: , Rfl:     vitamin d 125 MCG (5000 UT) capsule, Take 1 capsule by mouth Daily., Disp: , Rfl:     Accu-Chek Softclix Lancets lancets, 1 each by Other route Daily. (Patient not taking: Reported on 6/24/2025), Disp: , Rfl:     amLODIPine (NORVASC) 10 MG tablet, Take 0.5 tablets by mouth Every 12 (Twelve) Hours. (Patient not taking: Reported on 6/24/2025), Disp: , Rfl:     clopidogrel (PLAVIX) 75 MG tablet, Take 1 tablet by mouth Daily. (Patient not taking: Reported on 6/24/2025), Disp: , Rfl:     glucose blood test strip, 1 each by Other route Daily. (Patient not taking: Reported on 6/24/2025), Disp: , Rfl:     ipratropium-albuterol (DUO-NEB) 0.5-2.5 mg/3 ml nebulizer, USE one NEBULIZER vial FOUR TIMES DAILY as needed FOR wheezing (Patient not taking: Reported on 6/24/2025), Disp: , Rfl:     levalbuterol (XOPENEX) 1.25 MG/3ML nebulizer solution, Take 1 ampule by nebulization Every 4 (Four) Hours As Needed for Wheezing., Disp: 120 each, Rfl: 5    memantine (NAMENDA) 5 MG tablet, Take 2 tablets twice a day. (Patient not taking: Reported on 6/24/2025), Disp: 60 tablet, Rfl: 5    metoprolol succinate XL (TOPROL-XL) 25 MG 24 hr tablet, Take 1 tablet by mouth. (Patient not taking: Reported on 6/24/2025), Disp: , Rfl:     multivitamin (Multi Vitamin Daily) tablet tablet, Take 0.5 tablets by mouth. (Patient not taking: Reported on 6/24/2025), Disp: , Rfl:     terbinafine (lamISIL) 1 % cream, Apply  topically to the appropriate area as directed. (Patient not taking: Reported on 6/24/2025), Disp: , Rfl:     Zinc 50 MG tablet, Take  by mouth. (Patient not taking: Reported on 6/24/2025), Disp: , Rfl:      Objective   Physical  "Exam  Constitutional:       General: He is not in acute distress.     Appearance: Normal appearance. He is normal weight.   HENT:      Right Ear: Hearing normal.      Left Ear: Hearing normal.      Nose: No nasal tenderness or congestion.      Mouth/Throat:      Mouth: Mucous membranes are moist. No oral lesions.   Eyes:      Extraocular Movements: Extraocular movements intact.      Pupils: Pupils are equal, round, and reactive to light.   Cardiovascular:      Rate and Rhythm: Normal rate and regular rhythm.      Pulses: Normal pulses.      Heart sounds: Normal heart sounds. No murmur heard.  Pulmonary:      Effort: Pulmonary effort is normal.      Breath sounds: Normal breath sounds. No wheezing, rhonchi or rales.   Musculoskeletal:      Right lower leg: No edema.      Left lower leg: No edema.   Skin:     General: Skin is warm and dry.      Findings: No lesion or rash.   Neurological:      General: No focal deficit present.      Mental Status: He is alert and oriented to person, place, and time.   Psychiatric:         Mood and Affect: Affect normal. Mood is not anxious or depressed.         Vital Signs:   /92 (BP Location: Right arm, Patient Position: Sitting, Cuff Size: Large Adult)   Pulse 56   Temp 97.6 °F (36.4 °C) (Oral)   Resp 16   Ht 180.3 cm (71\")   Wt 101 kg (222 lb 12.8 oz)   SpO2 97% Comment: RA  BMI 31.07 kg/m²        Result Review :   The following data was reviewed by: RAKESH Yates on 06/24/2025:    Data reviewed : Radiologic studies chest CT 6/3/2024PFT 2/8/2023 and my last office note   Procedures        Assessment and Plan    Diagnoses and all orders for this visit:    1. Mild intermittent asthma without complication (Primary)  Comments:  continue Trelegy 200  Orders:  -     Fluticasone-Umeclidin-Vilant (TRELEGY ELLIPTA) 200-62.5-25 MCG/ACT inhaler; Inhale 1 puff Daily.  Dispense: 60 each; Refill: 11  -     levalbuterol (XOPENEX) 1.25 MG/3ML nebulizer solution; Take 1 ampule " by nebulization Every 4 (Four) Hours As Needed for Wheezing.  Dispense: 120 each; Refill: 5    2. Pulmonary sarcoidosis    3. MARTIN (obstructive sleep apnea)  Comments:  continue BiPAP    4. Dyspnea on exertion  Comments:  trial xopenex nebulizer due to increased heart rate from albuterol    5. Chronic cough        Assessment & Plan  1. Productive cough.  The patient reports an intermittent productive cough since 2022, which improves with steam inhalation. He has been on antifungal treatment due to a previous fungal infection in his lungs. He uses a nebulizer for breathing treatments but experiences an increased heart rate with albuterol. A prescription for Xopenex (levalbuterol) will be provided as an alternative to albuterol due to its fewer side effects, including less impact on heart rate. The necessary paperwork for insurance approval will be completed.          Follow Up   Return in about 6 months (around 12/24/2025) for Recheck.  Patient was given instructions and counseling regarding his condition or for health maintenance advice. Please see specific information pulled into the AVS if appropriate.

## 2025-06-24 ENCOUNTER — OFFICE VISIT (OUTPATIENT)
Dept: PULMONOLOGY | Facility: CLINIC | Age: 60
End: 2025-06-24
Payer: COMMERCIAL

## 2025-06-24 VITALS
SYSTOLIC BLOOD PRESSURE: 139 MMHG | DIASTOLIC BLOOD PRESSURE: 92 MMHG | RESPIRATION RATE: 16 BRPM | BODY MASS INDEX: 31.19 KG/M2 | WEIGHT: 222.8 LBS | HEIGHT: 71 IN | TEMPERATURE: 97.6 F | HEART RATE: 56 BPM | OXYGEN SATURATION: 97 %

## 2025-06-24 DIAGNOSIS — G47.33 OSA (OBSTRUCTIVE SLEEP APNEA): Chronic | ICD-10-CM

## 2025-06-24 DIAGNOSIS — R05.3 CHRONIC COUGH: ICD-10-CM

## 2025-06-24 DIAGNOSIS — D86.0 PULMONARY SARCOIDOSIS: ICD-10-CM

## 2025-06-24 DIAGNOSIS — J45.20 MILD INTERMITTENT ASTHMA WITHOUT COMPLICATION: Primary | Chronic | ICD-10-CM

## 2025-06-24 DIAGNOSIS — R06.09 DYSPNEA ON EXERTION: ICD-10-CM

## 2025-06-24 PROCEDURE — 99214 OFFICE O/P EST MOD 30 MIN: CPT | Performed by: NURSE PRACTITIONER

## 2025-06-24 RX ORDER — TERBINAFINE HYDROCHLORIDE 250 MG/1
250 TABLET ORAL DAILY
COMMUNITY
Start: 2025-05-01

## 2025-06-24 RX ORDER — LEVALBUTEROL INHALATION SOLUTION 1.25 MG/3ML
3 SOLUTION RESPIRATORY (INHALATION) EVERY 4 HOURS PRN
Qty: 120 EACH | Refills: 5 | Status: SHIPPED | OUTPATIENT
Start: 2025-06-24

## 2025-06-24 RX ORDER — ALBUTEROL SULFATE 1.25 MG/3ML
1.25 SOLUTION RESPIRATORY (INHALATION) EVERY 6 HOURS PRN
COMMUNITY